# Patient Record
Sex: MALE | Race: WHITE | Employment: FULL TIME | URBAN - METROPOLITAN AREA
[De-identification: names, ages, dates, MRNs, and addresses within clinical notes are randomized per-mention and may not be internally consistent; named-entity substitution may affect disease eponyms.]

---

## 2020-11-05 ENCOUNTER — VIRTUAL VISIT (OUTPATIENT)
Dept: FAMILY MEDICINE CLINIC | Age: 25
End: 2020-11-05
Payer: COMMERCIAL

## 2020-11-05 PROCEDURE — 99202 OFFICE O/P NEW SF 15 MIN: CPT | Performed by: FAMILY MEDICINE

## 2020-11-05 RX ORDER — NAPROXEN 500 MG/1
500 TABLET ORAL 2 TIMES DAILY
COMMUNITY
Start: 2019-11-23 | End: 2021-09-20

## 2020-11-05 ASSESSMENT — PATIENT HEALTH QUESTIONNAIRE - PHQ9
2. FEELING DOWN, DEPRESSED OR HOPELESS: 0
SUM OF ALL RESPONSES TO PHQ QUESTIONS 1-9: 0
1. LITTLE INTEREST OR PLEASURE IN DOING THINGS: 0
SUM OF ALL RESPONSES TO PHQ9 QUESTIONS 1 & 2: 0
SUM OF ALL RESPONSES TO PHQ QUESTIONS 1-9: 0
SUM OF ALL RESPONSES TO PHQ QUESTIONS 1-9: 0

## 2020-11-05 ASSESSMENT — ENCOUNTER SYMPTOMS
ABDOMINAL PAIN: 0
CONSTIPATION: 0
DIARRHEA: 0
RHINORRHEA: 0
SORE THROAT: 0
SHORTNESS OF BREATH: 0
CHEST TIGHTNESS: 0

## 2020-11-05 NOTE — PROGRESS NOTES
Subjective:   Patient ID: Babak Little is a 30 y.o. male. HPI by clinical support staff:   Chief Complaint   Patient presents with    Wrist Injury     Patient complains of right wrist injury on 11/3. Denies any swelling. Preliminary data above this line collected by clinical support staff.    ______________________________________________________________________     HPI by Provider:   HPI   Patient presents via virtual visit to establish care. History reviewed and updated with patient today. Reports right wrist pain 2 days ago while at the gym, did not do anything out of his routine but noticed pain was worse after an hour of exercising. Denies any previous fracture in site. Pain 8/10 when he rotates wrist, mild with palpation. Tried hand brace and ibuprofen 800 mg every 4 hours with some relief. Worried because he [place] this [place] and doesn't want to worsen injury. Wondering if he can get an MRI. Data above this line collected by Provider. Patient's medications, allergies, past medical, surgical, social and family histories were reviewed and updated as appropriate. Patient Care Team:  Coleman Shepard MD as PCP - General (Family Medicine)  Coleman Shepard MD as PCP - [facility] Empaneled Provider  Current Outpatient Medications on File Prior to Visit   Medication Sig Dispense Refill    naproxen (NAPROSYN) 500 MG tablet Take 500 mg by mouth 2 times daily      cetirizine (ZYRTEC) 10 MG tablet Take 10 mg by mouth daily       No current facility-administered medications on file prior to visit. Review of Systems   Constitutional: Negative for activity change, appetite change, fatigue and fever. HENT: Negative for congestion, rhinorrhea and sore throat. Respiratory: Negative for chest tightness and shortness of breath. Cardiovascular: Negative for chest pain, palpitations and leg swelling. Gastrointestinal: Negative for abdominal pain, constipation and diarrhea. Genitourinary: Negative for dysuria and frequency. Musculoskeletal: Negative for arthralgias. Right wrist pain   Neurological: Negative for dizziness, weakness and headaches. Psychiatric/Behavioral: Negative for hallucinations. All other systems reviewed and are negative. ROS above this line reviewed by Provider. Objective: There were no vitals taken for this visit. Physical Exam  Nursing note reviewed. Constitutional:       General: He is not in acute distress. Appearance: Normal appearance. He is not ill-appearing, toxic-appearing or diaphoretic. HENT:      Head: Normocephalic and atraumatic. Pulmonary:      Effort: Pulmonary effort is normal.   Neurological:      Mental Status: He is alert. Psychiatric:         Mood and Affect: Mood normal.         Behavior: Behavior normal.       Assessment and Plan:   1. Acute pain of right wrist  Likely a strain, advised brace, antiinflammatory and ice. Follow up with not improving in 1 week. Osvaldo Estrella  is a 30 y.o. male being evaluated by a Virtual Visit (video visit) encounter to address concerns as mentioned above. A caregiver was present when appropriate. Due to this being a TeleHealth encounter (During [facility]- public health emergency), evaluation of the following organ systems was limited: Vitals/Constitutional/EENT/Resp/CV/GI//MS/Neuro/Skin/Heme-Lymph-Imm. Pursuant to the emergency declaration under the [facility], [address] [place] [address] authority and the [employer] and [employer] Act, this Virtual Visit was conducted with patient's (and/or legal guardian's) consent, to reduce the patient's risk of exposure to COVID-19 and provide necessary medical care. The patient (and/or legal guardian) has also been advised to contact this office for worsening conditions or problems, and seek emergency medical treatment and/or call 911 if deemed necessary.      Patient identification was verified at the start of the visit: Yes    Total time spent for this encounter: Not billed by time    Services were provided through a video synchronous discussion virtually to substitute for in-person clinic visit. Patient  located at their individual homes. This chart note was prepared using a voice recognition dictation program. This note was reviewed for accuracy; however, addition, deletion and sound-alike word errors may occur. If there are any questions regarding this chart note, please contact the originating provider. Electronically signed by   Jsoefina Crandall MD  11/5/2020   9:43 AM    No follow-ups on file.

## 2020-12-02 ENCOUNTER — OFFICE VISIT (OUTPATIENT)
Dept: FAMILY MEDICINE CLINIC | Age: 25
End: 2020-12-02
Payer: COMMERCIAL

## 2020-12-02 ENCOUNTER — HOSPITAL ENCOUNTER (OUTPATIENT)
Age: 25
Discharge: HOME | End: 2020-12-02
Payer: COMMERCIAL

## 2020-12-02 ENCOUNTER — HOSPITAL ENCOUNTER (OUTPATIENT)
Dept: GENERAL RADIOLOGY | Age: 25
Discharge: HOME | End: 2020-12-02
Payer: COMMERCIAL

## 2020-12-02 ENCOUNTER — TELEPHONE (OUTPATIENT)
Dept: FAMILY MEDICINE CLINIC | Age: 25
End: 2020-12-02

## 2020-12-02 VITALS
SYSTOLIC BLOOD PRESSURE: 124 MMHG | HEIGHT: 71 IN | BODY MASS INDEX: 26.46 KG/M2 | HEART RATE: 66 BPM | TEMPERATURE: 98 F | WEIGHT: 189 LBS | DIASTOLIC BLOOD PRESSURE: 72 MMHG | OXYGEN SATURATION: 98 %

## 2020-12-02 PROCEDURE — 99213 OFFICE O/P EST LOW 20 MIN: CPT | Performed by: FAMILY MEDICINE

## 2020-12-02 PROCEDURE — 73110 X-RAY EXAM OF WRIST: CPT

## 2020-12-02 ASSESSMENT — ENCOUNTER SYMPTOMS
CHEST TIGHTNESS: 0
RHINORRHEA: 0
ABDOMINAL PAIN: 0
CONSTIPATION: 0
DIARRHEA: 0
SORE THROAT: 0
SHORTNESS OF BREATH: 0

## 2020-12-02 NOTE — PATIENT INSTRUCTIONS
Hi Mr. Oumou Lopez,  It was a pleasure seeing you today. As discussed:  ·  I have placed a referral for physical therapy, please call them if you do not hear from them in 7 days. · Get your imaging done soon - you can call [phone] to schedule your imaging. Please do not hesitate to call or send a message if you have questions or concerns. Our goal is to ensure your complete wellbeing. Enjoy the rest of the week.   Dr Ritu Swenson

## 2020-12-02 NOTE — PROGRESS NOTES
Subjective:   Patient ID: Komal Simms is a 30 y.o. male. HPI by clinical support staff:   **   Preliminary data above this line collected by clinical support staff.    ______________________________________________________________________     HPI by Provider:   HPI   Patient presents for follow up on wrist pain. Only painful  When lifting objects- no tenderness or  Limited motion. Continued use of brace  And takes Vit D and calcium. Pain severe when present but mostly has no pain. Data above this line collected by Provider. Patient's medications, allergies, past medical, surgical, social and family histories were reviewed and updated as appropriate. Patient Care Team:  Valeria Polanco MD as PCP - General (Family Medicine)  Valeria Polanco MD as PCP - [facility] Empaneled Provider    Current Outpatient Medications on File Prior to Visit   Medication Sig Dispense Refill    naproxen (NAPROSYN) 500 MG tablet Take 500 mg by mouth 2 times daily      cetirizine (ZYRTEC) 10 MG tablet Take 10 mg by mouth daily       No current facility-administered medications on file prior to visit. Review of Systems   Constitutional: Negative for activity change, appetite change, fatigue and fever. HENT: Negative for congestion, rhinorrhea and sore throat. Respiratory: Negative for chest tightness and shortness of breath. Cardiovascular: Negative for chest pain, palpitations and leg swelling. Gastrointestinal: Negative for abdominal pain, constipation and diarrhea. Genitourinary: Negative for dysuria and frequency. Musculoskeletal: Positive for arthralgias. Negative for gait problem, joint swelling and myalgias. Neurological: Negative for dizziness, weakness and headaches. Psychiatric/Behavioral: Negative for hallucinations. All other systems reviewed and are negative. ROS above this line reviewed by Provider.       Objective:   /72 (Site: Right Upper Arm, Position: Sitting, Cuff Size: Small

## 2020-12-06 ENCOUNTER — HOSPITAL ENCOUNTER (OUTPATIENT)
Dept: MRI IMAGING | Age: 25
Discharge: HOME | End: 2020-12-06
Payer: COMMERCIAL

## 2020-12-06 PROCEDURE — 73221 MRI JOINT UPR EXTREM W/O DYE: CPT

## 2020-12-08 ENCOUNTER — PATIENT MESSAGE (OUTPATIENT)
Dept: FAMILY MEDICINE CLINIC | Age: 25
End: 2020-12-08

## 2020-12-09 NOTE — TELEPHONE ENCOUNTER
Spoke to patient- explained mri results, clarified role of orthopedics. Contact information provided.

## 2020-12-09 NOTE — TELEPHONE ENCOUNTER
From: Madeline Serrano  To: Esha Leon MD  Sent: 12/8/2020 5:02 PM EST  Subject: Test Results Question    Dr. Leonora Stallings,    Would you be able to elaborate on the comments you made on my MRI results? You mention an \"old fracture\" which I do not remember discussing, unless you are referring to the current injury? Also is the referral to orthopedics for finalizing a diagnosis or for determining solutions, or both? If you would be able to briefly discuss over the phone I would appreciate it, my number is [phone]. Thank you!   Ban Lee

## 2020-12-15 ENCOUNTER — OFFICE VISIT (OUTPATIENT)
Dept: ORTHOPEDIC SURGERY | Age: 25
End: 2020-12-15
Payer: COMMERCIAL

## 2020-12-15 VITALS — TEMPERATURE: 96.7 F | WEIGHT: 185 LBS | HEIGHT: 71 IN | BODY MASS INDEX: 25.9 KG/M2

## 2020-12-15 PROCEDURE — 99243 OFF/OP CNSLTJ NEW/EST LOW 30: CPT | Performed by: ORTHOPAEDIC SURGERY

## 2020-12-15 NOTE — PROGRESS NOTES
This 30 y.o. right hand dominant  is seen in consultation for Priyank Cordova MD with a chief complaint of pain in the right wrist.  Symptoms have been present for 4 weeks. Pain was first noticed while lifting weights in the gym, which he does frequently, but there is no history of injury. Pain is most severe on the dorsal aspect but becomes diffuse toward the ulnar side. It is minimal at rest and aggravated by twisting movements, lifting and gripping. It not associated with paresthesia, crepitance or mechanical symptoms. Swelling has not been noticed. Similar pain is not noted in the opposite upper extremity. Symptoms have improved recently. Xrays were done and read as showing a fracture, prompting this referral.  An MRI was also done. The pain assessment has been reviewed and is correct. The patient's social history, past medical history, family history, medications, allergies and review of systems, entered 12/15/20, have been reviewed, and dated and are recorded in the chart. On physical examination the patient is Height: 5' 11\" (180.3 cm) tall and weighs Weight: 185 lb (83.9 kg). Respirations are 18 per minute. The patient is well nourished, is oriented to time and place, demonstrates appropriate mood and affect as well as normal gait and station. There is no soft tissue swelling present about the right wrist.  There is no discoloration. There is no deformity or atrophy. Tenderness is present on palpation anywhere about the wrist including the DRUJ, TFCC and scaphoid. Range of motion of the fingers, thumb and wrist is normal bilaterally and is not accompanied by pain or crepetance. Skin is intact, as is distal circulation and sensation. Gross muscle strength is normal bilaterally. Hand and wrist joints are stable. There are no subcutaneous nodules or enlarged epitrochlear lymph nodes. Xrays: Review of outside Xrays of the left wrist demonstrate a triangular shaped ossicle located between the lunate, triquetrum, capitate and hamate, surrounded on all sides with smooth joint cartilage spaces. A MRI of the right wrist confirms the Xray images. 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 The Xrays are shown to the patient. 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 5/1/53). No medical or surgical treatment is indicated at this time. Activity modifications are discussed. I would anticipate continued slow resolution of his remaining symptoms over the next several weeks. The usual course of events in the resolution of the symptoms associated with this condition is fully discussed with the patient. As long as they progress as expected, they do not need to return for further follow up. They are, however, urged to call or return if they have questions or concerns.

## 2021-02-23 ENCOUNTER — PATIENT MESSAGE (OUTPATIENT)
Dept: FAMILY MEDICINE CLINIC | Age: 26
End: 2021-02-23

## 2021-02-23 ENCOUNTER — OFFICE VISIT (OUTPATIENT)
Dept: FAMILY MEDICINE CLINIC | Age: 26
End: 2021-02-23
Payer: COMMERCIAL

## 2021-02-23 VITALS
HEART RATE: 65 BPM | BODY MASS INDEX: 26.28 KG/M2 | WEIGHT: 194 LBS | TEMPERATURE: 98.1 F | HEIGHT: 72 IN | SYSTOLIC BLOOD PRESSURE: 106 MMHG | DIASTOLIC BLOOD PRESSURE: 60 MMHG | OXYGEN SATURATION: 98 %

## 2021-02-23 DIAGNOSIS — Z00.00 PREVENTATIVE HEALTH CARE: Primary | ICD-10-CM

## 2021-02-23 DIAGNOSIS — Z00.00 PREVENTATIVE HEALTH CARE: ICD-10-CM

## 2021-02-23 LAB
BASOPHILS ABSOLUTE: 0.1 K/UL (ref 0–0.2)
BASOPHILS RELATIVE PERCENT: 1.2 %
EOSINOPHILS ABSOLUTE: 0.2 K/UL (ref 0–0.6)
EOSINOPHILS RELATIVE PERCENT: 3.3 %
HCT VFR BLD CALC: 43.8 % (ref 40.5–52.5)
HEMOGLOBIN: 15 G/DL (ref 13.5–17.5)
LYMPHOCYTES ABSOLUTE: 1.6 K/UL (ref 1–5.1)
LYMPHOCYTES RELATIVE PERCENT: 29.2 %
MCH RBC QN AUTO: 30.5 PG (ref 26–34)
MCHC RBC AUTO-ENTMCNC: 34.2 G/DL (ref 31–36)
MCV RBC AUTO: 89.3 FL (ref 80–100)
MONOCYTES ABSOLUTE: 0.5 K/UL (ref 0–1.3)
MONOCYTES RELATIVE PERCENT: 10 %
NEUTROPHILS ABSOLUTE: 3.1 K/UL (ref 1.7–7.7)
NEUTROPHILS RELATIVE PERCENT: 56.3 %
PDW BLD-RTO: 13.2 % (ref 12.4–15.4)
PLATELET # BLD: 203 K/UL (ref 135–450)
PMV BLD AUTO: 9.9 FL (ref 5–10.5)
RBC # BLD: 4.9 M/UL (ref 4.2–5.9)
WBC # BLD: 5.4 K/UL (ref 4–11)

## 2021-02-23 PROCEDURE — 99395 PREV VISIT EST AGE 18-39: CPT | Performed by: FAMILY MEDICINE

## 2021-02-23 SDOH — ECONOMIC STABILITY: INCOME INSECURITY: HOW HARD IS IT FOR YOU TO PAY FOR THE VERY BASICS LIKE FOOD, HOUSING, MEDICAL CARE, AND HEATING?: NOT HARD AT ALL

## 2021-02-23 ASSESSMENT — PATIENT HEALTH QUESTIONNAIRE - PHQ9
1. LITTLE INTEREST OR PLEASURE IN DOING THINGS: 0
SUM OF ALL RESPONSES TO PHQ QUESTIONS 1-9: 0
SUM OF ALL RESPONSES TO PHQ9 QUESTIONS 1 & 2: 0

## 2021-02-23 ASSESSMENT — ENCOUNTER SYMPTOMS
CONSTIPATION: 0
CHEST TIGHTNESS: 0
DIARRHEA: 0
ABDOMINAL PAIN: 0
RHINORRHEA: 0
SORE THROAT: 0
SHORTNESS OF BREATH: 0

## 2021-02-23 NOTE — TELEPHONE ENCOUNTER
From: Dileep Mariano  To: Silvestre Cortez MD  Sent: 2/23/2021 2:02 PM EST  Subject: Visit Follow-Up Question    As discussed in my appointment this morning, attached is the list of immunizations with dates that I received prior to moving to [place]. It appears that this confirms I am due for another Tetanus shot? Hopefully this information can be logged with [facility] to create a complete vaccination history.     Thank you,  Rosamaria Cox

## 2021-02-23 NOTE — PATIENT INSTRUCTIONS
Hi Mr. Ella Causey,  It was a pleasure seeing you today. As discussed:  · Please get your labs done at your earliest convenience-fasting. We will call you with the results. Please do not hesitate to call or send a message if you have questions or concerns. Our goal is to ensure your complete wellbeing. Enjoy the rest of the week.   Dr Jeanie Souza

## 2021-02-23 NOTE — PROGRESS NOTES
Annual Exam  SUBJECTIVE  Leah Sanchez is a 27 y.o. male and is here for a comprehensive physical exam- last exam was. The patient is sexually active- female. Performs self testicular exams yes . The patient participates in regular exercise: yes 5 times a week for 1-2 hours- lifting and elliptical. Has the patient ever been transfused or tattooed?: yes. Reports seatbelt use. Diet: described as healthy. Goals for 2021: \"stay healthy\"  Last eye exam: 3 years ago  Last Dental visit: 2 weeks ago. Additional History, if indicated: n/a    PHQ-2/9 Depression Screening from Assessments   PHQ Scores 2/23/2021 11/5/2020   PHQ2 Score 0 0   PHQ9 Score 0 0         Health Maintenance Due   Topic Date Due    DTaP/Tdap/Td vaccine (1 - Tdap) 02/05/2012     Current Outpatient Medications on File Prior to Visit   Medication Sig Dispense Refill    naproxen (NAPROSYN) 500 MG tablet Take 500 mg by mouth 2 times daily      cetirizine (ZYRTEC) 10 MG tablet Take 10 mg by mouth daily       No current facility-administered medications on file prior to visit. Past Medical History:   Diagnosis Date    ADHD 2002     No past surgical history on file.     No Known Allergies  Social History     Socioeconomic History    Marital status: Single     Spouse name: Not on file    Number of children: Not on file    Years of education: Not on file    Highest education level: Not on file   Occupational History    Occupation:     Social Needs    Financial resource strain: Not hard at all   [employer] insecurity     Worry: Never true     Inability: Never true   [employer] needs     Medical: No     Non-medical: No   Tobacco Use    Smoking status: Never Smoker    Smokeless tobacco: Never Used   Substance and Sexual Activity    Alcohol use: Yes     Comment: couple times a week    Drug use: No    Sexual activity: Yes     Partners: Female   Lifestyle  Physical activity     Days per week: Not on file     Minutes per session: Not on file    Stress: Not on file   Relationships    Social connections     Talks on phone: Not on file     Gets together: Not on file     Attends [employer] service: Not on file     Active member of club or organization: Not on file     Attends meetings of clubs or organizations: Not on file     Relationship status: Not on file    Intimate partner violence     Fear of current or ex partner: Not on file     Emotionally abused: Not on file     Physically abused: Not on file     Forced sexual activity: Not on file   Other Topics Concern    Not on file   Social History Narrative    Not on file     Review of Systems   Constitutional: Negative for activity change, appetite change, fatigue and fever. HENT: Negative for congestion, rhinorrhea and sore throat. Respiratory: Negative for chest tightness and shortness of breath. Cardiovascular: Negative for chest pain, palpitations and leg swelling. Gastrointestinal: Negative for abdominal pain, constipation and diarrhea. Genitourinary: Negative for dysuria and frequency. Musculoskeletal: Negative for arthralgias. Neurological: Negative for dizziness, weakness and headaches. Psychiatric/Behavioral: Negative for hallucinations. All other systems reviewed and are negative. OBJECTIVE  /60   Pulse 65   Temp 98.1 °F (36.7 °C)   Ht 5' 11.5\" (1.816 m)   Wt 194 lb (88 kg)   SpO2 98%   BMI 26.68 kg/m²   Chaperone not applicable for exam.   Physical Exam  Vitals signs and nursing note reviewed. Constitutional:       General: He is not in acute distress. Appearance: Normal appearance. He is normal weight. He is not ill-appearing, toxic-appearing or diaphoretic. HENT:      Head: Normocephalic and atraumatic. Right Ear: Tympanic membrane, ear canal and external ear normal. There is no impacted cerumen. Left Ear: Tympanic membrane, ear canal and external ear normal. There is no impacted cerumen. Nose: Nose normal. No congestion. Mouth/Throat:      Mouth: Mucous membranes are moist.      Pharynx: No oropharyngeal exudate or posterior oropharyngeal erythema. Eyes:      General: No scleral icterus. Conjunctiva/sclera: Conjunctivae normal.   Neck:      Musculoskeletal: Normal range of motion and neck supple. Cardiovascular:      Rate and Rhythm: Normal rate and regular rhythm. Heart sounds: Normal heart sounds. No murmur. No friction rub. No gallop. Pulmonary:      Effort: Pulmonary effort is normal. No respiratory distress. Breath sounds: Normal breath sounds. No stridor. No wheezing, rhonchi or rales. Abdominal:      General: Abdomen is flat. Bowel sounds are normal. There is no distension. Palpations: Abdomen is soft. Tenderness: There is no abdominal tenderness. Skin:     General: Skin is warm and dry. Neurological:      General: No focal deficit present. Mental Status: He is alert. Psychiatric:         Mood and Affect: Mood normal.         Behavior: Behavior normal.        Labs  Lab Results   Component Value Date    WBC 9.4 05/15/2017    HGB 15.9 05/15/2017    HCT 47.0 05/15/2017     05/15/2017    ALT 20 05/15/2017    AST 24 05/15/2017     05/15/2017    K 4.0 05/15/2017    CREATININE 1.0 05/15/2017    BUN 12 05/15/2017       ASSESSMENT AND PLAN  1. Preventative Exam (Preventive Services Calculator (USPSTF/AHRQ):  During this preventive care visit, the following were discussed and/or reviewed for appropriateness for this patient:  · Healthy diet and exercise habits. · Importance of self skin exam and sun protection  · Controversies around prostate cancer screening. Reviewed current differences in recommendations and risks/benefits of screening. · Reviewed need for lipid and glucose screening. · Reviewed immunization history. · Need for HIV or additional STD screening. · Need for tobacco/substance abuse cessation. · Appropriate quantity of alcohol intake. Further studies/referrals as noted. Recommend annual visit and as needed. - CBC Auto Differential; Future  - Comprehensive Metabolic Panel; Future  - Hemoglobin A1C; Future  - TSH WITH REFLEX TO FT4; Future  - Lipid Panel; Future         This chart note was prepared using a voice recognition dictation program.This note was reviewed for accuracy; however, addition,deletion and sound-alike word errors may occur. If there is any question regarding this chart note, please contact the originating provider.     Electronically signed by  May Jose MD  2/23/2021   11:57 AM    Return in about 1 year (around 2/23/2022) for Annual exam.

## 2021-02-24 LAB
A/G RATIO: 1.7 (ref 1.1–2.2)
ALBUMIN SERPL-MCNC: 4.5 G/DL (ref 3.4–5)
ALP BLD-CCNC: 56 U/L (ref 40–129)
ALT SERPL-CCNC: 34 U/L (ref 10–40)
ANION GAP SERPL CALCULATED.3IONS-SCNC: 11 MMOL/L (ref 3–16)
AST SERPL-CCNC: 32 U/L (ref 15–37)
BILIRUB SERPL-MCNC: 0.5 MG/DL (ref 0–1)
BUN BLDV-MCNC: 14 MG/DL (ref 7–20)
CALCIUM SERPL-MCNC: 9.4 MG/DL (ref 8.3–10.6)
CHLORIDE BLD-SCNC: 99 MMOL/L (ref 99–110)
CHOLESTEROL, TOTAL: 165 MG/DL (ref 0–199)
CO2: 26 MMOL/L (ref 21–32)
CREAT SERPL-MCNC: 0.9 MG/DL (ref 0.9–1.3)
ESTIMATED AVERAGE GLUCOSE: 91.1 MG/DL
GFR AFRICAN AMERICAN: >60
GFR NON-AFRICAN AMERICAN: >60
GLOBULIN: 2.6 G/DL
GLUCOSE BLD-MCNC: 95 MG/DL (ref 70–99)
HBA1C MFR BLD: 4.8 %
HDLC SERPL-MCNC: 66 MG/DL (ref 40–60)
LDL CHOLESTEROL CALCULATED: 88 MG/DL
POTASSIUM SERPL-SCNC: 4.4 MMOL/L (ref 3.5–5.1)
SODIUM BLD-SCNC: 136 MMOL/L (ref 136–145)
TOTAL PROTEIN: 7.1 G/DL (ref 6.4–8.2)
TRIGL SERPL-MCNC: 56 MG/DL (ref 0–150)
TSH REFLEX FT4: 1.45 UIU/ML (ref 0.27–4.2)
VLDLC SERPL CALC-MCNC: 11 MG/DL

## 2021-02-27 ENCOUNTER — PATIENT MESSAGE (OUTPATIENT)
Dept: FAMILY MEDICINE CLINIC | Age: 26
End: 2021-02-27

## 2021-02-27 DIAGNOSIS — M25.531 RIGHT WRIST PAIN: Primary | ICD-10-CM

## 2021-03-01 NOTE — TELEPHONE ENCOUNTER
From: Dionisio George  To: Julius Polk MD  Sent: 2/27/2021 3:02 PM EST  Subject: Non-Urgent Medical Question    My right wrist has flared up and is as bad as it was at the instance of the original injury back in November 2020. I would like to get another MRI and have it looked at by a different orthopedist. Could you please order an MRI when possible? My previous wrist MRI did not take place until weeks after the injury, so I hope that a second that is received while it is more enflamed might yield a better diagnosis that I can do something with? Unlike the diagnosis I got from Dr. Dejuan Douglass that I had no injury at all (which recent events seem to indicate was not correct). If you have any questions or there is something needed from me in order to move forward, or you would advise a different course of action, please let me know. Thank you!

## 2021-03-08 ENCOUNTER — OFFICE VISIT (OUTPATIENT)
Dept: ORTHOPEDIC SURGERY | Age: 26
End: 2021-03-08
Payer: COMMERCIAL

## 2021-03-08 VITALS — HEIGHT: 72 IN | WEIGHT: 194 LBS | RESPIRATION RATE: 16 BRPM | BODY MASS INDEX: 26.28 KG/M2 | TEMPERATURE: 97.5 F

## 2021-03-08 DIAGNOSIS — M25.531 WRIST PAIN, RIGHT: Primary | ICD-10-CM

## 2021-03-08 PROCEDURE — 99213 OFFICE O/P EST LOW 20 MIN: CPT | Performed by: ORTHOPAEDIC SURGERY

## 2021-03-08 NOTE — Clinical Note
Dear  Joycie Duverney, MD,    Thank you very much for your referral or Mr. Emily Gregory to me for evaluation and treatment of his Hand & Wrist condition. I appreciate your confidence in me and thank you for allowing me the opportunity to care for your patients. If I can be of any further assistance to you on this or any other patient, please do not hesitate to contact me. Sincerely,    Sumeet Oh.  Geo Brooks MD

## 2021-03-09 NOTE — PROGRESS NOTES
Mr. Dileep Mariano is a 27 y.o. right handed man  who is seen today in Hand Surgical Consultation at the request of Silvestre Cortez MD.    He presents today regarding right Wrist symptoms which have been present for approximately 6 months. A history of antecedent trauma or injury is Absent. He reports symptoms to include  moderate pain located in the Ulnar aspect of the wrist, no tenderness of the remaining fingers, hand, or elbow. He notes today, no neurologic symptoms in the Whole Hand. Symptoms were improving over time with rest, but have recently worsened over the last week with increased weightlifting. .      Previous treatment has included evaluation by  Dr. Rom Flores in November 2020 where an Os Epitriquetrum was identified on X-ray and conservative management of his symptoms was recommended. He does not claim relation of his symptoms to his required work activities. He has undergone any testing. I have today reviewed with Dileep Mariano the clinically relevant, past medical history, medications, allergies,  family history, social history, and Review Of Systems & I have documented any details relevant to today's presenting complaints in my history above. Mr. Windy Valenzuela self-reported past medical history, medications, allergies,  family history, social history, and Review Of Systems have been scanned into the chart under the \"Media\" tab. Physical Exam:  Mr. Windy Valenzuela most recent vitals:  Vitals  Temp: 97.5 °F (36.4 °C)  Temp Source: Infrared  Resp: 16  Height: 5' 11.5\" (181.6 cm)  Weight: 194 lb (88 kg)    He is well nourished, oriented to person, place & time. He demonstrates appropriate mood and affect as well as normal gait and station.     Skin: Normal in appearance, Normal Color and Free of Lesions Bilaterally   Digital range of motion is Full and equal bilaterally otherwise normal bilaterally  Wrist range of motion is full but mildly painful in maximal extension and ulnar deviation  on the Right, normal on the Left  Sensation is subjectively normal in the Whole Hand. All other digits are normally sensate bilaterally  Vascular examination reveals normal and good capillary refill bilaterally. There is no acute ecchymosis. Swelling is minimal in the Ulnar aspect of the wrist.  No other digit or hand bilaterally shows sign of swelling. There is no evidence of gross joint instability bilaterally. Muscular strength is clinically appropriate bilaterally. The base of the hand & wrist are not tender to palpation. There is not clinical evidence of skeletal deformity or mal-rotation. Maximal pain is elicited with palpation of the Ulnar aspect of the wrist. Specifically, the Ulnar Styloid is moderately tender to palpation, the dorsal margin of the TFCC is not tender to palpation, the DRUJ is mildly tender to palpation and without effusion or instability, the Ulnar Intra-Carpal joints are not tender to palpation and without instability, the ECU tendon is not tender to palpation and is not subluxing from it's sub-sheath. Ulnar sided pain is  exacerbated with maximal wrist Ulnar Deviation and is worse in full supination, unchanged in neutral rotation, and is unchanged in full pronation. Radiographic Evaluation:  Radiographs are reviewed  today (3 views of the right wrist were obtained in NEUTRAL ROTATION). They demonstrate no evidence of an acute fracture of the distal radius, ulna, or carpal bones (specifically the scaphoid appears normal). The CMC joints & bases of the  Metacarpals do not show displacement, acute injury, or acute change. The Distal Radio-Ulnar Joint reveals mild evidence of degenerative change. The Ulna shows 3 mm negative variance, worse on Maximal  View. The Ulnar Styloid is unusually large and prominent and on  view approaches the triquetrum closely. There is no evidence of cystic change of the proximal ulnar base of the Lunate.   There is not evidence of other static carpal instability of mal-position. MRI RIGHT WRIST:  12/6/20  Impression       1. Small corner fracture or remote bony defect along the upper margin lunate near the triquetrum without active edema or acute fracture in the wrist.       2. No joint effusion or capsular injury identified. The TFCC remains intact. Flexor and extensor tendons appear normal. No carpal ganglion on or midcarpal instability appreciated. Impression:  Mr. Emily Gregory presents with right Ulnar Sided Wrist pain and presents requesting further treatment. Plan:  I have had a thorough discussion with Mr. Emily Gregory regarding the treatment options available for his continued and recently worsened right  Ulnar Sided wrist pain, which is causing him significant symptoms and difficulty. I have outlined for Mr. Emily Gregory the risk, benefits and consequences of the various treatment modalities, including a reasonable expectation for the long term success of each. We have discussed the likelihood that further, more aggressive treatment may be required for his current presenting condition. Based upon our current discussion and a reasonable understating of the options available to him, Mr. Emily Gregory has selected to proceed with a conservative plan of treatment consisting of: the use of protective splints, activity modification, and the judicious use of over-the-counter anti-inflammatory medications if allowed by his primary care physician. An appropriately sized Removable forearm based Wrist orthosis was provided. Instructions were given regarding splint use and wear as well as suggestions for use of the other modalities were discussed. I have clearly explained to him that the above outlined treatment plan should not be expected to 'cure' his  Ulnar sided wrist pain, but we are rather treating the symptoms with which he presents.   He has understood that in order to achieve more durable relief of his symptoms and to prevent future worsening or further damage, that definitive surgical treatment would be required. Mr. Иван Yang  voiced an appropriate understanding of our discussion, the options available to him, and of the expectations of his selected  treatment. I have also discussed with Mr. Иван Yang  the other treatment options available to him  for this condition. We have today selected to proceed with conservative management. He and I have agreed that if our current course of conservative treatment does not prove to be effective over the short term future, that he will schedule a follow-up appointment to discuss and select an alternate course of therapy including possibly injection or surgical treatment. Mr. Иван Yang has been given a full verbal list of instructions and precautions related to his present condition. I have asked him to followup with me in the office at the prescribed time. He is also specifically requested to call or return to the office sooner if his symptoms change or worsen prior to the next scheduled appointment.

## 2021-04-01 ENCOUNTER — IMMUNIZATION (OUTPATIENT)
Dept: PRIMARY CARE CLINIC | Age: 26
End: 2021-04-01
Payer: COMMERCIAL

## 2021-04-01 PROCEDURE — 0011A COVID-19, MODERNA VACCINE 100MCG/0.5ML DOSE: CPT | Performed by: FAMILY MEDICINE

## 2021-04-01 PROCEDURE — 91301 COVID-19, MODERNA VACCINE 100MCG/0.5ML DOSE: CPT | Performed by: FAMILY MEDICINE

## 2021-04-29 ENCOUNTER — IMMUNIZATION (OUTPATIENT)
Dept: PRIMARY CARE CLINIC | Age: 26
End: 2021-04-29
Payer: COMMERCIAL

## 2021-04-29 PROCEDURE — 91301 COVID-19, MODERNA VACCINE 100MCG/0.5ML DOSE: CPT | Performed by: FAMILY MEDICINE

## 2021-04-29 PROCEDURE — 0012A COVID-19, MODERNA VACCINE 100MCG/0.5ML DOSE: CPT | Performed by: FAMILY MEDICINE

## 2021-08-20 ENCOUNTER — TELEPHONE (OUTPATIENT)
Dept: FAMILY MEDICINE CLINIC | Age: 26
End: 2021-08-20

## 2021-08-20 NOTE — TELEPHONE ENCOUNTER
Pt's mother called and spoke to registrar  She was requesting information about rehab facilities, Nephrologist referral, dietician,  [place], mother is not on [address]   Pt's mother was advised provider out of office today and mother said to cancel everything,   Unable to each mother again when I tried to contact her

## 2021-09-20 ENCOUNTER — PATIENT MESSAGE (OUTPATIENT)
Dept: FAMILY MEDICINE CLINIC | Age: 26
End: 2021-09-20

## 2021-09-20 DIAGNOSIS — M25.512 ACUTE PAIN OF BOTH SHOULDERS: Primary | ICD-10-CM

## 2021-09-20 DIAGNOSIS — M25.511 ACUTE PAIN OF BOTH SHOULDERS: Primary | ICD-10-CM

## 2021-09-20 DIAGNOSIS — M62.82 NON-TRAUMATIC RHABDOMYOLYSIS: Primary | ICD-10-CM

## 2021-09-20 NOTE — TELEPHONE ENCOUNTER
From: Babak Little  To: Coleman Shepard MD  Sent: 9/20/2021 12:42 PM EDT  Subject: Non-Urgent Medical Question    Dr. Brenda Prescott,    I am currently in 2 arm slings recovering from double shoulder surgery. Surgery was required to repair bilateral dislocation/fracture sustained while being treated for rhabdomyolysis and hyponatremia which were contracted simultaneously. As early as October 16 I will need to see a nephrologist and neurologist, and begin physical therapy to regain shoulder function. I do not need any referrals. Might you be able to recommend a nephrologist or neurologist, or where to go for PT? I would greatly appreciate any assistance you could provide. Thank you!   Keny Gutiérrez

## 2021-11-22 ENCOUNTER — TELEPHONE (OUTPATIENT)
Dept: FAMILY MEDICINE CLINIC | Age: 26
End: 2021-11-22

## 2021-11-24 ENCOUNTER — HOSPITAL ENCOUNTER (OUTPATIENT)
Dept: GENERAL RADIOLOGY | Age: 26
Discharge: HOME | End: 2021-11-24
Payer: COMMERCIAL

## 2021-11-24 ENCOUNTER — OFFICE VISIT (OUTPATIENT)
Dept: FAMILY MEDICINE CLINIC | Age: 26
End: 2021-11-24
Payer: COMMERCIAL

## 2021-11-24 VITALS
RESPIRATION RATE: 16 BRPM | OXYGEN SATURATION: 97 % | WEIGHT: 175.2 LBS | HEART RATE: 61 BPM | HEIGHT: 69 IN | BODY MASS INDEX: 25.95 KG/M2 | TEMPERATURE: 97.2 F | SYSTOLIC BLOOD PRESSURE: 110 MMHG | DIASTOLIC BLOOD PRESSURE: 70 MMHG

## 2021-11-24 DIAGNOSIS — R07.81 RIB PAIN: ICD-10-CM

## 2021-11-24 DIAGNOSIS — D64.9 ANEMIA, UNSPECIFIED TYPE: ICD-10-CM

## 2021-11-24 DIAGNOSIS — Z00.00 PREVENTATIVE HEALTH CARE: Primary | ICD-10-CM

## 2021-11-24 DIAGNOSIS — Z00.00 PREVENTATIVE HEALTH CARE: ICD-10-CM

## 2021-11-24 PROBLEM — M24.312 PATHOLOGICAL DISLOCATION OF SHOULDER JOINT, BILATERAL: Status: RESOLVED | Noted: 2021-09-01 | Resolved: 2021-11-24

## 2021-11-24 PROBLEM — M24.312 PATHOLOGICAL DISLOCATION OF SHOULDER JOINT, BILATERAL: Status: ACTIVE | Noted: 2021-09-01

## 2021-11-24 PROBLEM — M62.82 RHABDOMYOLYSIS: Status: ACTIVE | Noted: 2021-08-25

## 2021-11-24 PROBLEM — M24.311 PATHOLOGICAL DISLOCATION OF SHOULDER JOINT, BILATERAL: Status: ACTIVE | Noted: 2021-09-01

## 2021-11-24 PROBLEM — E87.1 HYPONATREMIA: Status: RESOLVED | Noted: 2021-08-16 | Resolved: 2021-11-24

## 2021-11-24 PROBLEM — M24.311 PATHOLOGICAL DISLOCATION OF SHOULDER JOINT, BILATERAL: Status: RESOLVED | Noted: 2021-09-01 | Resolved: 2021-11-24

## 2021-11-24 PROBLEM — E87.1 HYPONATREMIA: Status: ACTIVE | Noted: 2021-08-16

## 2021-11-24 PROBLEM — M62.82 RHABDOMYOLYSIS: Status: RESOLVED | Noted: 2021-08-25 | Resolved: 2021-11-24

## 2021-11-24 LAB
HCT VFR BLD CALC: 46.4 % (ref 40.5–52.5)
HEMOGLOBIN: 15.6 G/DL (ref 13.5–17.5)

## 2021-11-24 PROCEDURE — 99395 PREV VISIT EST AGE 18-39: CPT | Performed by: FAMILY MEDICINE

## 2021-11-24 PROCEDURE — 71101 X-RAY EXAM UNILAT RIBS/CHEST: CPT

## 2021-11-24 ASSESSMENT — ENCOUNTER SYMPTOMS
CHEST TIGHTNESS: 0
SORE THROAT: 0
RHINORRHEA: 0
DIARRHEA: 0
SHORTNESS OF BREATH: 0
CONSTIPATION: 0
ABDOMINAL PAIN: 0

## 2021-11-24 NOTE — PATIENT INSTRUCTIONS
Patient Education        Well Visit, Ages 25 to 48: Care Instructions  Overview     Well visits can help you stay healthy. Your doctor has checked your overall health and may have suggested ways to take good care of yourself. Your doctor also may have recommended tests. At home, you can help prevent illness with healthy eating, regular exercise, and other steps. Follow-up care is a key part of your treatment and safety. Be sure to make and go to all appointments, and call your doctor if you are having problems. It's also a good idea to know your test results and keep a list of the medicines you take. How can you care for yourself at home? · Get screening tests that you and your doctor decide on. Screening helps find diseases before any symptoms appear. · Eat healthy foods. Choose fruits, vegetables, whole grains, protein, and low-fat dairy foods. Limit fat, especially saturated fat. Reduce salt in your diet. · Limit alcohol. If you are a man, have no more than 2 drinks a day or 14 drinks a week. If you are a woman, have no more than 1 drink a day or 7 drinks a week. · Get at least 30 minutes of physical activity on most days of the week. Walking is a good choice. You also may want to do other activities, such as running, swimming, cycling, or playing tennis or team sports. Discuss any changes in your exercise program with your doctor. · Reach and stay at a healthy weight. This will lower your risk for many problems, such as obesity, diabetes, heart disease, and high blood pressure. · Do not smoke or allow others to smoke around you. If you need help quitting, talk to your doctor about stop-smoking programs and medicines. These can increase your chances of quitting for good. · Care for your mental health. It is easy to get weighed down by worry and stress. Learn strategies to manage stress, like deep breathing and mindfulness, and stay connected with your family and community.  If you find you often feel sad or hopeless, talk with your doctor. Treatment can help. · Talk to your doctor about whether you have any risk factors for sexually transmitted infections (STIs). You can help prevent STIs if you wait to have sex with a new partner (or partners) until you've each been tested for STIs. It also helps if you use condoms (male or female condoms) and if you limit your sex partners to one person who only has sex with you. Vaccines are available for some STIs, such as HPV. · Use birth control if it's important to you to prevent pregnancy. Talk with your doctor about the choices available and what might be best for you. · If you think you may have a problem with alcohol or drug use, talk to your doctor. This includes prescription medicines (such as amphetamines and opioids) and illegal drugs (such as cocaine and methamphetamine). Your doctor can help you figure out what type of treatment is best for you. · Protect your skin from too much sun. When you're outdoors from 10 a.m. to 4 p.m., stay in the shade or cover up with clothing and a hat with a wide brim. Wear sunglasses that block UV rays. Even when it's cloudy, put broad-spectrum sunscreen (SPF 30 or higher) on any exposed skin. · See a dentist one or two times a year for checkups and to have your teeth cleaned. · Wear a seat belt in the car. When should you call for help? Watch closely for changes in your health, and be sure to contact your doctor if you have any problems or symptoms that concern you. Where can you learn more? Go to [email or web address] and sign in to your [employer] account. Enter P072 in the [facility] box to learn more about \"Well Visit, Ages 25 to 48: Care Instructions. \"     If you do not have an account, please click on the \"Sign Up Now\" link. Current as of: February 11, 2021               Content Version: 13.0  © 8759-4713 Healthwise, Incorporated. Care instructions adapted under license by [facility] ([facility]). If you have questions about a medical condition or this instruction, always ask your healthcare professional. [place] [address] any warranty or liability for your use of this information.

## 2021-11-24 NOTE — PROGRESS NOTES
Annual Exam  DONALDO Keyes is a 27 y.o. male and is here for a comprehensive physical exam- last exam was 2020. The patient is not currently sexually active. Performs self testicular exams not asked . The patient participates in regular exercise: yes. Has the patient ever been transfused or tattooed?: not asked. Reports seatbelt use. Additional History, if indicated: left rib pain - excruciating when coughs or inhales. Saw ortho advised to talk to me. Chief Complaint   Patient presents with    Annual Exam      PHQ-2/9 Depression Screening from Assessments   PHQ Scores 2/23/2021 11/5/2020   PHQ2 Score 0 0   PHQ9 Score 0 0         Health Maintenance Due   Topic Date Due    DTaP/Tdap/Td vaccine (7 - Td or Tdap) 03/04/2019     No current outpatient medications on file prior to visit. No current facility-administered medications on file prior to visit. 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   No Known Allergies  Social History     Socioeconomic History    Marital status: Single     Spouse name: Not on file    Number of children: Not on file    Years of education: Not on file    Highest education level: Not on file   Occupational History    Occupation:     Tobacco Use    Smoking status: Never Smoker    Smokeless tobacco: Never Used   Vaping Use    Vaping Use: Never used   Substance and Sexual Activity    Alcohol use: Yes     Comment: couple times a week    Drug use: No    Sexual activity: Yes     Partners: Female   Other Topics Concern    Not on file   Social History Narrative    Not on file     Social Determinants of Health     Financial Resource Strain: Low Risk     Difficulty of Paying Living Expenses: Not hard at all   Food Insecurity: No Food Insecurity    Worried About [facility] in the Last Year: Never true    Jackie of Food in the Last Year: Never true   Transportation Needs: No Transportation Needs    Lack of Transportation (Medical): No    Lack of Transportation (Non-Medical): No   Physical Activity:     Days of Exercise per Week: Not on file    Minutes of Exercise per Session: Not on file   Stress:     Feeling of Stress : Not on file   Social Connections:     Frequency of Communication with Friends and Family: Not on file    Frequency of Social Gatherings with Friends and Family: Not on file    Attends Yazidism Services: Not on file    Active Member of [address] [place] [address] or Organizations: Not on file    Attends Club or Organization Meetings: Not on file    Marital Status: Not on file   Intimate Partner Violence:     Fear of Current or Ex-Partner: Not on file    Emotionally Abused: Not on file    Physically Abused: Not on file    Sexually Abused: Not on file   Housing Stability:     Unable to Pay for Housing in the Last Year: Not on file    Number of [place] in the Last Year: Not on file    Unstable Housing in the Last Year: Not on file     Review of Systems   Constitutional: Negative for activity change, appetite change, fatigue and fever. HENT: Negative for congestion, rhinorrhea and sore throat. Respiratory: Negative for chest tightness and shortness of breath. Cardiovascular: Positive for chest pain (rib). Negative for palpitations and leg swelling. Gastrointestinal: Negative for abdominal pain, constipation and diarrhea. Genitourinary: Negative for dysuria and frequency. Musculoskeletal: Negative for arthralgias. Neurological: Negative for dizziness, weakness and headaches. Psychiatric/Behavioral: Negative for hallucinations. All other systems reviewed and are negative.        OBJECTIVE  /70   Pulse 61   Temp 97.2 °F (36.2 °C) (Tympanic) Resp 16   Ht 5' 9.25\" (1.759 m)   Wt 175 lb 3.2 oz (79.5 kg)   SpO2 97%   BMI 25.69 kg/m²   Chaperone not applicable for exam.   Physical Exam  Vitals and nursing note reviewed. Constitutional:       General: He is not in acute distress. Appearance: Normal appearance. He is normal weight. He is not ill-appearing, toxic-appearing or diaphoretic. HENT:      Head: Normocephalic and atraumatic. Eyes:      General: No scleral icterus. Conjunctiva/sclera: Conjunctivae normal.   Cardiovascular:      Rate and Rhythm: Normal rate and regular rhythm. Heart sounds: Normal heart sounds. No murmur heard. No friction rub. No gallop. Pulmonary:      Effort: Pulmonary effort is normal. No respiratory distress. Breath sounds: Normal breath sounds. No stridor. No wheezing, rhonchi or rales. Chest:      Chest wall: Tenderness (left lower ribs) present. Musculoskeletal:      Cervical back: Normal range of motion. Skin:     General: Skin is warm and dry. Neurological:      Mental Status: He is alert. Psychiatric:         Mood and Affect: Mood normal.         Behavior: Behavior normal.        Labs  Lab Results   Component Value Date    WBC 5.4 02/23/2021    HGB 15.0 02/23/2021    HCT 43.8 02/23/2021     02/23/2021    TRIG 56 02/23/2021    HDL 66 (H) 02/23/2021    ALT 34 02/23/2021    AST 32 02/23/2021     02/23/2021    K 4.4 02/23/2021    CREATININE 0.9 02/23/2021    BUN 14 02/23/2021       ASSESSMENT AND PLAN  1. Preventative Exam  During this preventive care visit, the following were discussed and/or reviewed for appropriateness for this patient:  · Healthy diet and exercise habits. · Importance of self skin exam and sun protection  · Reviewed need for lipid and glucose screening. · Reviewed immunization history. · Appropriate quantity of alcohol intake. Further studies/referrals as noted. Recommend annual visit and as needed.   - HEMOGLOBIN AND HEMATOCRIT, BLOOD; Future    2. Anemia, unspecified type  - HEMOGLOBIN AND HEMATOCRIT, BLOOD; Future    3. Rib pain  - XR RIBS LEFT INCLUDE CHEST (MIN 3 VIEWS); Future       This chart note was prepared using a voice recognition dictation program.This note was reviewed for accuracy; however, addition,deletion and sound-alike word errors may occur. If there is any question regarding this chart note, please contact the originating provider. Electronically signed by  Chucky Talavera MD  11/24/2021   2:42 PM    Return in about 6 months (around 5/24/2022).

## 2022-01-18 ENCOUNTER — VIRTUAL VISIT (OUTPATIENT)
Dept: PRIMARY CARE CLINIC | Age: 27
End: 2022-01-18
Payer: COMMERCIAL

## 2022-01-18 DIAGNOSIS — G89.29 CHRONIC MIDLINE THORACIC BACK PAIN: Primary | ICD-10-CM

## 2022-01-18 DIAGNOSIS — R29.890 HEIGHT LOSS: ICD-10-CM

## 2022-01-18 DIAGNOSIS — M54.6 CHRONIC MIDLINE THORACIC BACK PAIN: Primary | ICD-10-CM

## 2022-01-18 PROCEDURE — 99214 OFFICE O/P EST MOD 30 MIN: CPT | Performed by: FAMILY MEDICINE

## 2022-01-18 ASSESSMENT — ENCOUNTER SYMPTOMS
CONSTIPATION: 0
SHORTNESS OF BREATH: 0
BACK PAIN: 1
SORE THROAT: 0
DIARRHEA: 0
RHINORRHEA: 0
CHEST TIGHTNESS: 0
ABDOMINAL PAIN: 0

## 2022-01-18 NOTE — PROGRESS NOTES
Subjective:   Patient ID: Tapan Kimball is a 27 y.o. male. HPI by clinical support staff:   Chief Complaint   Patient presents with   Donnie Borrego, is on the phone for a V V for back pain. Preliminary data above this line collected by clinical support staff.    ______________________________________________________________________  HPI by Provider:   HPI   Patient presents today with complaint of upper back pain and loss of heights. States that since his surgery earlier last year has noticed that his ability to reach for upper cabinets has been limited. He did check his height at home and it is consistent with a 5 foot 9 inches read. Patient is very concerned about possible causes for height loss did discuss with orthopedist who did not have any input. States that back pain is worse with movement and is mainly between the shoulder blades. Data above this line collected by Provider. Patient's medications, allergies, past medical, surgical, social and family histories were reviewed and updated as appropriate. Patient Care Team:  Tera Thayer MD as PCP - General (Family Medicine)  Tera Thayer MD as PCP [facility] [place] Provider    No current outpatient medications on file prior to visit. No current facility-administered medications on file prior to visit. Review of Systems   Constitutional: Negative for activity change, appetite change, fatigue and fever. HENT: Negative for congestion, rhinorrhea and sore throat. Respiratory: Negative for chest tightness and shortness of breath. Cardiovascular: Negative for chest pain, palpitations and leg swelling. Gastrointestinal: Negative for abdominal pain, constipation and diarrhea. Genitourinary: Negative for dysuria and frequency. Musculoskeletal: Positive for back pain. Negative for arthralgias. Neurological: Negative for dizziness, weakness and headaches. Psychiatric/Behavioral: Negative for hallucinations.    All other systems reviewed and are negative. ROS above this line reviewed by Provider. Objective: There were no vitals taken for this visit. Physical Exam  Nursing note reviewed. Constitutional:       General: He is not in acute distress. Appearance: Normal appearance. He is not ill-appearing, toxic-appearing or diaphoretic. HENT:      Head: Normocephalic and atraumatic. Pulmonary:      Effort: Pulmonary effort is normal.   Neurological:      Mental Status: He is alert. Psychiatric:         Mood and Affect: Mood normal.         Behavior: Behavior normal.       Assessment and Plan:   1. Chronic midline thoracic back pain  History of rhabdomyolysis prior to start of pain. Rule out  Compression fracture given height loss noted by patient. Ht Readings from Last 3 Encounters:   11/24/21 5' 9.25\" (1.759 m)   03/08/21 5' 11.5\" (1.816 m)   02/23/21 5' 11.5\" (1.816 m)     - BASIC METABOLIC PANEL; Future  - CT THORACIC SPINE W WO CONTRAST; Future    2. Height loss  History of rhabdomyolysis prior to start of pain. Rule out  Compression fracture given height loss noted by patient.  - BASIC METABOLIC PANEL; Future  - CT THORACIC SPINE W WO CONTRAST; Future       Bailee Reis  is a 27 y.o. male being evaluated by a Virtual Visit (video visit) encounter to address concerns as mentioned above. A caregiver was present when appropriate. Due to this being a TeleHealth encounter (During UDTHB-93 public health emergency), evaluation of the following organ systems was limited: Vitals/Constitutional/EENT/Resp/CV/GI//MS/Neuro/Skin/Heme-Lymph-Imm. Pursuant to the emergency declaration under the [facility], [address] [place] [address] authority and the [employer] Act, this Virtual Visit was conducted with patient's (and/or legal guardian's) consent, to reduce the patient's risk of exposure to COVID-19 and provide necessary medical care.   The patient (and/or legal guardian) has also been advised to contact this office for worsening conditions or problems, and seek emergency medical treatment and/or call 911 if deemed necessary. Patient identification was verified at the start of the visit: Yes    Total time spent for this encounter: Not billed by time    Services were provided through a video synchronous discussion virtually to substitute for in-person clinic visit. Patient  located at their individual homes. This chart note was prepared using a voice recognition dictation program. This note was reviewed for accuracy; however, addition, deletion and sound-alike word errors may occur. If there are any questions regarding this chart note, please contact the originating provider. Electronically signed by   David Valadez MD  1/18/2022   12:47 PM    No follow-ups on file.

## 2022-01-19 ENCOUNTER — TELEPHONE (OUTPATIENT)
Dept: PRIMARY CARE CLINIC | Age: 27
End: 2022-01-19

## 2022-01-19 DIAGNOSIS — M54.6 CHRONIC MIDLINE THORACIC BACK PAIN: Primary | ICD-10-CM

## 2022-01-19 DIAGNOSIS — G89.29 CHRONIC MIDLINE THORACIC BACK PAIN: Primary | ICD-10-CM

## 2022-01-19 NOTE — TELEPHONE ENCOUNTER
Order for W/WO Contrast for the Thoracic Spine needs to be resent just as a W/O Contrast.    Thank you.

## 2022-01-22 ENCOUNTER — HOSPITAL ENCOUNTER (OUTPATIENT)
Age: 27
Discharge: HOME | End: 2022-01-22
Payer: COMMERCIAL

## 2022-01-22 DIAGNOSIS — R29.890 HEIGHT LOSS: ICD-10-CM

## 2022-01-22 DIAGNOSIS — G89.29 CHRONIC MIDLINE THORACIC BACK PAIN: ICD-10-CM

## 2022-01-22 DIAGNOSIS — M54.6 CHRONIC MIDLINE THORACIC BACK PAIN: ICD-10-CM

## 2022-01-22 LAB
ANION GAP SERPL CALCULATED.3IONS-SCNC: 17 MMOL/L (ref 3–16)
BUN BLDV-MCNC: 16 MG/DL (ref 7–20)
CALCIUM SERPL-MCNC: 9.6 MG/DL (ref 8.3–10.6)
CHLORIDE BLD-SCNC: 97 MMOL/L (ref 99–110)
CO2: 27 MMOL/L (ref 21–32)
CREAT SERPL-MCNC: 0.9 MG/DL (ref 0.9–1.3)
GFR AFRICAN AMERICAN: >60
GFR NON-AFRICAN AMERICAN: >60
GLUCOSE BLD-MCNC: 69 MG/DL (ref 70–99)
POTASSIUM SERPL-SCNC: 4.8 MMOL/L (ref 3.5–5.1)
SODIUM BLD-SCNC: 141 MMOL/L (ref 136–145)

## 2022-01-22 PROCEDURE — 36415 COLL VENOUS BLD VENIPUNCTURE: CPT

## 2022-01-22 PROCEDURE — 80048 BASIC METABOLIC PNL TOTAL CA: CPT

## 2022-01-24 ENCOUNTER — TELEPHONE (OUTPATIENT)
Dept: PRIMARY CARE CLINIC | Age: 27
End: 2022-01-24

## 2022-01-24 ENCOUNTER — HOSPITAL ENCOUNTER (OUTPATIENT)
Dept: CT IMAGING | Age: 27
Discharge: HOME | End: 2022-01-24
Payer: COMMERCIAL

## 2022-01-24 ENCOUNTER — PATIENT MESSAGE (OUTPATIENT)
Dept: PRIMARY CARE CLINIC | Age: 27
End: 2022-01-24

## 2022-01-24 DIAGNOSIS — S22.000A COMPRESSION FRACTURE OF BODY OF THORACIC VERTEBRA (HCC): Primary | ICD-10-CM

## 2022-01-24 DIAGNOSIS — R29.890 HEIGHT LOSS: ICD-10-CM

## 2022-01-24 DIAGNOSIS — M54.6 CHRONIC MIDLINE THORACIC BACK PAIN: ICD-10-CM

## 2022-01-24 DIAGNOSIS — G89.29 CHRONIC MIDLINE THORACIC BACK PAIN: ICD-10-CM

## 2022-01-24 PROCEDURE — 72128 CT CHEST SPINE W/O DYE: CPT

## 2022-01-24 NOTE — PROGRESS NOTES
Discussed CT results with Patient - compression fractures in 3 vertebrae. Will obtain Dexa scan Patient referred to neurosurgery and endocrinology.

## 2022-01-24 NOTE — TELEPHONE ENCOUNTER
I spoke with Larry Velazquez, regarding the referral that Dr. Juan Antonio Kaba sent over for his this morning.

## 2022-01-31 ENCOUNTER — HOSPITAL ENCOUNTER (OUTPATIENT)
Dept: WOMENS IMAGING | Age: 27
Discharge: HOME | End: 2022-01-31
Payer: COMMERCIAL

## 2022-01-31 DIAGNOSIS — S22.000A COMPRESSION FRACTURE OF BODY OF THORACIC VERTEBRA (HCC): ICD-10-CM

## 2022-01-31 PROCEDURE — 77080 DXA BONE DENSITY AXIAL: CPT

## 2022-02-28 ENCOUNTER — OFFICE VISIT (OUTPATIENT)
Dept: PRIMARY CARE CLINIC | Age: 27
End: 2022-02-28
Payer: COMMERCIAL

## 2022-02-28 VITALS
OXYGEN SATURATION: 91 % | HEART RATE: 59 BPM | DIASTOLIC BLOOD PRESSURE: 70 MMHG | HEIGHT: 70 IN | TEMPERATURE: 97 F | WEIGHT: 177.38 LBS | BODY MASS INDEX: 25.39 KG/M2 | SYSTOLIC BLOOD PRESSURE: 127 MMHG | RESPIRATION RATE: 16 BRPM

## 2022-02-28 DIAGNOSIS — S22.000A COMPRESSION FRACTURE OF BODY OF THORACIC VERTEBRA (HCC): Primary | ICD-10-CM

## 2022-02-28 DIAGNOSIS — Z11.3 SCREEN FOR STD (SEXUALLY TRANSMITTED DISEASE): ICD-10-CM

## 2022-02-28 DIAGNOSIS — Z23 NEED FOR DIPHTHERIA-TETANUS-PERTUSSIS (TDAP) VACCINE: ICD-10-CM

## 2022-02-28 PROBLEM — S22.000B: Chronic | Status: ACTIVE | Noted: 2022-02-10

## 2022-02-28 PROBLEM — S22.000B: Status: ACTIVE | Noted: 2022-02-10

## 2022-02-28 LAB
HAV IGM SER IA-ACNC: NORMAL
HEPATITIS B CORE IGM ANTIBODY: NORMAL
HEPATITIS B SURFACE ANTIGEN INTERPRETATION: NORMAL
HEPATITIS C ANTIBODY INTERPRETATION: NORMAL

## 2022-02-28 PROCEDURE — 90471 IMMUNIZATION ADMIN: CPT | Performed by: FAMILY MEDICINE

## 2022-02-28 PROCEDURE — 90715 TDAP VACCINE 7 YRS/> IM: CPT | Performed by: FAMILY MEDICINE

## 2022-02-28 PROCEDURE — 99214 OFFICE O/P EST MOD 30 MIN: CPT | Performed by: FAMILY MEDICINE

## 2022-02-28 RX ORDER — VIT C/B6/B5/MAGNESIUM/HERB 173 50-5-6-5MG
1 CAPSULE ORAL DAILY
COMMUNITY

## 2022-02-28 RX ORDER — MULTIVIT-MIN/IRON/FOLIC ACID/K 18-600-40
CAPSULE ORAL
COMMUNITY

## 2022-02-28 RX ORDER — CHLORAL HYDRATE 500 MG
3000 CAPSULE ORAL DAILY
COMMUNITY

## 2022-02-28 RX ORDER — ASCORBIC ACID 500 MG
500 TABLET ORAL DAILY
COMMUNITY

## 2022-02-28 SDOH — ECONOMIC STABILITY: FOOD INSECURITY: WITHIN THE PAST 12 MONTHS, THE FOOD YOU BOUGHT JUST DIDN'T LAST AND YOU DIDN'T HAVE MONEY TO GET MORE.: NEVER TRUE

## 2022-02-28 SDOH — ECONOMIC STABILITY: FOOD INSECURITY: WITHIN THE PAST 12 MONTHS, YOU WORRIED THAT YOUR FOOD WOULD RUN OUT BEFORE YOU GOT MONEY TO BUY MORE.: NEVER TRUE

## 2022-02-28 ASSESSMENT — PATIENT HEALTH QUESTIONNAIRE - PHQ9
1. LITTLE INTEREST OR PLEASURE IN DOING THINGS: 0
SUM OF ALL RESPONSES TO PHQ QUESTIONS 1-9: 0
SUM OF ALL RESPONSES TO PHQ9 QUESTIONS 1 & 2: 0
2. FEELING DOWN, DEPRESSED OR HOPELESS: 0
SUM OF ALL RESPONSES TO PHQ QUESTIONS 1-9: 0

## 2022-02-28 ASSESSMENT — ENCOUNTER SYMPTOMS
CONSTIPATION: 0
BACK PAIN: 1
SHORTNESS OF BREATH: 0
ABDOMINAL PAIN: 0
SORE THROAT: 0
CHEST TIGHTNESS: 0
DIARRHEA: 0
RHINORRHEA: 0

## 2022-02-28 ASSESSMENT — SOCIAL DETERMINANTS OF HEALTH (SDOH): HOW HARD IS IT FOR YOU TO PAY FOR THE VERY BASICS LIKE FOOD, HOUSING, MEDICAL CARE, AND HEATING?: NOT HARD AT ALL

## 2022-02-28 NOTE — PROGRESS NOTES
Subjective:   Patient ID: Heide Goff is a 32 y.o. male. HPI by clinical support staff:   Chief Complaint   Patient presents with    Exposure to STD     Den Flurry, is here to have a STD check. Preliminary data above this line collected by clinical support staff.    ______________________________________________________________________  HPI by Provider:   HPI   Presents today for STD screening. States he has been sexually active for several years has never been tested for STDs. States that he is currently in the monogamous hetero relationship and is not yet sexually active but anticipates it will happen soon. Partner is on birth control and patient does not plan on using condoms. Did follow-up with [facility] was told by the nurse practitioner the wedge compression fractures needed to be fixed by physical therapy but states that he continues to have pain. Has an appointment with neurosurgery to evaluate his MRI further. Data above this line collected by Provider. Patient's medications, allergies, past medical, surgical, social and family histories were reviewed and updated as appropriate. Patient Care Team:  Elton Pereira MD as PCP - General (Family Medicine)  Elton Pereira MD as PCP - [facility] Empaneled Provider  Current Outpatient Medications on File Prior to Visit   Medication Sig Dispense Refill    Multiple Vitamins-Minerals (VITAMINS TO GO MEN PO) Take by mouth      vitamin C (ASCORBIC ACID) 500 MG tablet Take 500 mg by mouth daily      Cholecalciferol (VITAMIN D) 50 MCG (2000 UT) CAPS capsule Take by mouth      turmeric 500 MG CAPS Take 1 capsule by mouth daily      Potassium (POTASSIMIN PO) Take by mouth      Omega-3 Fatty Acids (FISH OIL) 1000 MG CAPS Take 3,000 mg by mouth 3 times daily       No current facility-administered medications on file prior to visit. Review of Systems   Constitutional: Negative for activity change, appetite change, fatigue and fever. an appointment with neurosurgery to discuss results. 2. Screen for STD (sexually transmitted disease)  Currently not sexually active has not been previously tested-has a potential new sexual partner.  - HIV Screen; Future  - C.trachomatis N.gonorrhoeae DNA, Urine; Future  - Syphilis Antibody Cascading Reflex; Future  - Hepatitis Panel, Acute; Future  - Herpes simplex virus (HSV) I/II antibodies IgG & IgM w/ reflex; Future    3. Need for diphtheria-tetanus-pertussis (Tdap) vaccine  Discussed recommended vaccines- common side effects and timing for follow up vaccine. After shared decision making and patient/parent agrees to get vaccine today. Denies any previous vaccine reactions.  - Tdap (age 6y and older) IM ([employer] [address])           This chart note was prepared using a voice recognition dictation program. This note was reviewed for accuracy; however, addition, deletion and sound-alike word errors may occur. If there are any questions regarding this chart note, please contact the originating provider. Electronically signed by   Nico Avila MD  2/28/2022   12:42 PM    Return in about 6 months (around 8/28/2022).

## 2022-03-01 LAB
C. TRACHOMATIS DNA ,URINE: NEGATIVE
HIV AG/AB: NORMAL
HIV ANTIGEN: NORMAL
HIV-1 ANTIBODY: NORMAL
HIV-2 AB: NORMAL
N. GONORRHOEAE DNA, URINE: NEGATIVE
TOTAL SYPHILLIS IGG/IGM: NORMAL

## 2022-03-04 LAB
HERPES TYPE 1/2 IGM COMBINED: 0.75 IV
HERPES TYPE I/II IGG COMBINED: 4.56 IV
HSV 1 GLYCOPROTEIN G AB IGG: 0.64 IV
HSV 2 GLYCOPROTEIN G AB IGG: 1.02 IV

## 2022-03-30 ENCOUNTER — PATIENT MESSAGE (OUTPATIENT)
Dept: PRIMARY CARE CLINIC | Age: 27
End: 2022-03-30

## 2022-03-30 DIAGNOSIS — L30.9 DERMATITIS: Primary | ICD-10-CM

## 2022-03-30 NOTE — TELEPHONE ENCOUNTER
From: Giuliano Rao  To: Dr. Melchor [place]: 3/30/2022 4:13 PM EDT  Subject: Dermatology Referral    Dr. Elif Luis,    I am allergic to sunscreen and have been since I was young. The dermatologist I saw as a kid was not able to determine the exact catalyst nor find a solution but that was over 15 years ago, so I would like to try again in case there has been any new treatment, medication, brand, etc that has been discovered or made available since then. I tried just making an appointment at the [facility] Dermatology office but was told they were not seeing new patients and that I should contact you for a referral instead. Would you be able to assist in finding a Dermatologist who could see me? Thank you!   Priscille Mohs

## 2022-04-04 ENCOUNTER — OFFICE VISIT (OUTPATIENT)
Dept: ENDOCRINOLOGY | Age: 27
End: 2022-04-04
Payer: COMMERCIAL

## 2022-04-04 VITALS
RESPIRATION RATE: 14 BRPM | HEIGHT: 70 IN | OXYGEN SATURATION: 98 % | SYSTOLIC BLOOD PRESSURE: 150 MMHG | DIASTOLIC BLOOD PRESSURE: 78 MMHG | WEIGHT: 186 LBS | BODY MASS INDEX: 26.63 KG/M2 | TEMPERATURE: 98 F | HEART RATE: 75 BPM

## 2022-04-04 DIAGNOSIS — M89.9 BONE DISORDER: ICD-10-CM

## 2022-04-04 DIAGNOSIS — S22.000A COMPRESSION FRACTURE OF BODY OF THORACIC VERTEBRA (HCC): Primary | ICD-10-CM

## 2022-04-04 PROCEDURE — 99204 OFFICE O/P NEW MOD 45 MIN: CPT | Performed by: INTERNAL MEDICINE

## 2022-04-04 NOTE — PROGRESS NOTES
SUBJECTIVE:  Vandana Moses is a 32 y.o. male who is being evaluated for compression fracture of thoracic vertebra for endocrinology related bone disorder. 1. Compression fracture of body of thoracic vertebra (HCC)  This started in 9/2021. Patient was diagnosed with thoracic vertebra compression fracture. The problem has been unchanged. Patient started medication in N/A. Currently patient is on: N/A. Misses N/A doses a month. Current complaints: back pain, shoulder pain  Has joint pains, knee, elbow, wrist pains, mostly activity related. Back pain and shoulder pain are related to previous trauma. On 8/16/2021 patient had 7 miles running race and the developed severe rhabdomyolysis and hyponatremia. At that time he had seizure, memory loss. Patient was unconscious. Patient had restraints for all night and was trying to get out. He developed a dislocation of both shoulders, fractures of both shoulders. His back pain started at that time, most likely presenting compression fracture. He had several surgeries and bone grafts. He was hospitalized for 1 month. Since then patient is 2 inches shorter. He had pain after surgery. He is currently doing physical therapy at home. Patient states that PT does not improve his pain significantly. He only had very slightly improvement in 6 months. His pain is 8 out of 10. It is located in the back in the thoracic area. It is radiating to the chest.  He does not take medications for pain. The pain is worse with the cough, laying down, sitting or standing. Patient saw spinal surgeon for consultation. The recommendation was to do physical therapy. Patient denies family history of bone disorders or osteoporosis. Patient has been always active with the sports and activities. He usually follows keto diet in January for 2 weeks. No RA, using steroids long term, smoking history. No spine or hip fractures in mother or father.     History of obstructive symptoms: difficulty swallowing No, changes in voice/hoarseness No.  History of radiation to patient's neck: No  Resent iodine exposure: No  Family history includes no thyroid abnormalities. Family history of thyroid cancer: No    Patient to cut testosterone booster prior to the injury, but not afterwards. He is not sure what was in the supplement. He felt better on it. Patient denies problems with libido or erectile function. He takes vitamin D 2000 international units 1-2 times daily. Takes calcium  In MVI    2. Bone disorder  Patient was referred for endocrinology consultation regarding compression fracture to evaluate for possible bone disorder. He had injuries prior to thoracic vertebral fracture per patient report. Mostly related to activities and sports. I reviewed all available laboratory and imaging data. There was no record of reported thoracic spine compression fracture prior to trauma what he experienced when restrained. EXAMINATION:   CT OF THE THORACIC SPINE WITHOUT CONTRAST  1/24/2022 7:41 am:       TECHNIQUE:   CT of the thoracic spine was performed without the administration of   intravenous contrast. Multiplanar reformatted images are provided for review.    Dose modulation, iterative reconstruction, and/or weight based adjustment of   the mA/kV was utilized to reduce the radiation dose to as low as reasonably   achievable.       COMPARISON:   None.       HISTORY:   ORDERING SYSTEM PROVIDED HISTORY: Chronic midline thoracic back pain   TECHNOLOGIST PROVIDED HISTORY:   Reason for Exam: loss of 2 inches in height in 6 month time span   Relevant Medical/Surgical History: no hx of surg to back       FINDINGS:   BONES/ALIGNMENT: There is mild wedge compression fracture deformity involving   the superior endplates of T5, T6 and T7.  There is approximately 15% loss of   height at T5, 50% loss of height at T6 and less than 10% loss of height at   T7.  There is no retropulsion. [place] disc space narrowing and endplate   osteophyte formation is present at these levels.       DEGENERATIVE CHANGES: No gross spinal canal stenosis or bony neural foraminal   narrowing of the thoracic spine.       SOFT TISSUES: No paraspinal mass is seen.           Impression   Mild chronic midthoracic compression fractures/spondylosis. EXAMINATION:   BONE DENSITOMETRY       1/31/2022 1:51 pm       TECHNIQUE:   A bone density DEXA scan was performed of the lumbar spine and left hip on a   Hologic system.       COMPARISON:   None.       HISTORY:   ORDERING SYSTEM PROVIDED HISTORY: Compression fracture of body of thoracic   vertebra [facility])       53-year-old male with compression fracture of body of thoracic vertebra       FINDINGS:   LUMBAR SPINE:       The bone mineral density in the lumbar spine including the L1-L4 levels is   measured at 1.048 g/cm2, which corresponds to a T-score of -0.4 and a Z-score   of -0.4.  This is within the normal range by WHO criteria.       LEFT HIP:       The bone mineral density in the total hip is measured at 1.115 g/cm2   corresponding to a T-score of 0.5 and a Z-score of 0.6.  This is within the   normal range by [facility] criteria.       The bone mineral density of the femoral neck is measured at 0.944 g/cm2   corresponding to a T-score of 0.1 and a Z-score of 0.2.  This is within the   normal range by [facility] criteria.           Impression   Normal bone mineral density by WHO criteria.  Fracture risk is low.       RECOMMENDATIONS:   Unavailable         1. Small corner fracture or remote bony defect along the upper margin lunate near the triquetrum without active edema or acute fracture in the wrist.       2. No joint effusion or capsular injury identified. The TFCC remains intact. Flexor and extensor tendons appear normal. No carpal ganglion on or midcarpal instability appreciated.          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    Rhabdomyolysis 08/25/2021    Seizure [facility])      Patient Active Problem List    Diagnosis Date Noted    Compression fracture of body of thoracic vertebra ([facility] 04/04/2022    Bone disorder 04/04/2022    Wedge compression fracture of unspecified thoracic vertebra, initial encounter for open fracture ([facility] 02/10/2022    ADHD, predominantly inattentive type 05/17/2006     Past Surgical History:   Procedure Laterality Date    SHOULDER SURGERY Bilateral 09/2021     Family History   Problem Relation Age of Onset    No Known Problems Mother     No Known Problems Father     No Known Problems Brother     No Known Problems Maternal Grandmother     No Known Problems Maternal Grandfather     No Known Problems Paternal Grandmother     No Known Problems Paternal Grandfather     No Known Problems Brother      Social History     Socioeconomic History    Marital status: Single     Spouse name: None    Number of children: None    Years of education: None    Highest education level: None   Occupational History    Occupation:     Tobacco Use    Smoking status: Never Smoker    Smokeless tobacco: Never Used   Vaping Use    Vaping Use: Never used   Substance and Sexual Activity    Alcohol use: Yes     Comment: couple times a week    Drug use: No    Sexual activity: Yes     Partners: Female   Other Topics Concern    None   Social History Narrative    None     Social Determinants of Health     Financial Resource Strain: Low Risk     Difficulty of Paying Living Expenses: Not hard at all   Food Insecurity: No Food Insecurity    Worried About Running Out of Food in the Last Year: Never true    Jackie of Food in the Last Year: Never true   Transportation Needs:     Lack of Transportation (Medical): Not on file    Lack of Transportation (Non-Medical):  Not on file   Physical Activity:     Days of Exercise per Week: Not on file    Minutes of Exercise per Session: Not on file   Stress:     Feeling of Stress : Not on file   Social Connections:     Frequency of Communication with Friends and Family: Not on file    Frequency of Social Gatherings with Friends and Family: Not on file    Attends Scientologist Services: Not on file    Active Member of Clubs or Organizations: Not on file    Attends Club or Organization Meetings: Not on file    Marital Status: Not on file   Intimate Partner Violence:     Fear of Current or Ex-Partner: Not on file    Emotionally Abused: Not on file    Physically Abused: Not on file    Sexually Abused: Not on file   Housing Stability:     Unable to Pay for Housing in the Last Year: Not on file    Number of [place] in the Last Year: Not on file    Unstable Housing in the Last Year: Not on file     Current Outpatient Medications   Medication Sig Dispense Refill    Multiple Vitamins-Minerals (VITAMINS TO GO MEN PO) Take by mouth      vitamin C (ASCORBIC ACID) 500 MG tablet Take 500 mg by mouth daily      Cholecalciferol (VITAMIN D) 50 MCG (2000 UT) CAPS capsule Take by mouth      turmeric 500 MG CAPS Take 1 capsule by mouth daily      Potassium (POTASSIMIN PO) Take by mouth      Omega-3 Fatty Acids (FISH OIL) 1000 MG CAPS Take 3,000 mg by mouth daily        No current facility-administered medications for this visit.      No Known Allergies  Family Status   Relation Name Status    Mother  Alive    Father  Alive    Brother  Alive    MGM      MGF      PGM      PGF  Alive    Brother  Alive       Review of Systems:  Constitutional: has fatigue, no fever, no recent weight gain, no recent weight loss, no changes in appetite  Eyes: no eye pain, no change in vision, no eye redness, no eye irritation, no double vision  Ears, nose, throat: no nasal congestion, no sore throat, no earache, no decrease in hearing, no hoarseness, no dry mouth, no sinus problems, no difficulty swallowing, no neck lumps, no dental problems, no mouth sores, has ringing in ears  Pulmonary: no shortness of breath, no wheezing, no dyspnea on exertion, no cough  Cardiovascular: no chest pain, no lower extremity edema, no orthopnea, no intermittent leg claudication, no palpitations  Gastrointestinal: no abdominal pain, no nausea, no vomiting, no diarrhea, no constipation, no dysphagia, no heartburn, no bloating  Genitourinary: no dysuria, no urinary incontinence, no urinary hesitancy, no urinary frequency, no feelings of urinary urgency, no nocturia  Musculoskeletal: no joint swelling, no joint stiffness, has joint pain, no muscle cramps, no muscle pain  Integument/Breast: no skin rashes, no skin lesions, no itching, no dry skin  Neurological: no numbness, no tingling, no weakness, no confusion, no headaches, no dizziness, no fainting, no tremors, no decrease in memory, no balance problems  Psychiatric: no anxiety, no depression, no insomnia  Hematologic/Lymphatic: no tendency for easy bleeding, no swollen lymph nodes, no tendency for easy bruising  Immunology: no seasonal allergies, no frequent infections, no frequent illnesses  Endocrine: no temperature intolerance    BP (!) 150/78   Pulse 75   Temp 98 °F (36.7 °C)   Resp 14   Ht 5' 10\" (1.778 m)   Wt 186 lb (84.4 kg)   SpO2 98%   BMI 26.69 kg/m²    Wt Readings from Last 3 Encounters:   04/04/22 186 lb (84.4 kg)   02/28/22 177 lb 6 oz (80.5 kg)   11/24/21 175 lb 3.2 oz (79.5 kg)     Body mass index is 26.69 kg/m².     OBJECTIVE:  Constitutional: no acute distress, well appearing and well nourished  Psychiatric: oriented to person, place and time, judgement and insight and normal, recent and remote memory and intact and mood and affect are normal  Skin: skin and subcutaneous tissue is normal without mass, normal turgor  Head and Face: examination of head and face revealed no abnormalities  Eyes: no lid or conjunctival swelling, erythema or discharge, pupils are normal, equal, round, reactive to light  Ears/Nose: external inspection of ears and nose revealed no abnormalities, hearing is grossly normal  Oropharynx/Mouth/Face: lips, tongue and gums are normal with no lesions, the voice quality was normal  Neck: neck is supple and symmetric, with midline trachea and no masses, thyroid is normal  Lymphatics: normal cervical lymph nodes, normal supraclavicular nodes  Pulmonary: no increased work of breathing or signs of respiratory distress, lungs are clear to auscultation  Cardiovascular: normal heart rate and rhythm, normal S1 and S2, no murmurs and pedal pulses and 2+ bilaterally, No edema  Abdomen: abdomen is soft, non-tender with no masses  Musculoskeletal: normal gait and station and exam of the digits and nails are normal, no tenderness over thoracic spine area on palpation  Neurological: normal coordination and normal general cortical function      Lab Review:    Lab Results   Component Value Date    WBC 5.4 02/23/2021    HGB 15.6 11/24/2021    HCT 46.4 11/24/2021    MCV 89.3 02/23/2021     02/23/2021     Lab Results   Component Value Date     01/22/2022    K 4.8 01/22/2022    CL 97 01/22/2022    CO2 27 01/22/2022    BUN 16 01/22/2022    CREATININE 0.9 01/22/2022    GLUCOSE 69 01/22/2022    CALCIUM 9.6 01/22/2022    PROT 7.1 02/23/2021    LABALBU 4.9 11/24/2021    BILITOT 0.5 02/23/2021    ALKPHOS 56 02/23/2021    AST 32 02/23/2021    ALT 34 02/23/2021    LABGLOM >60 01/22/2022    GFRAA >60 01/22/2022    AGRATIO 1.7 02/23/2021    GLOB 2.6 02/23/2021     Lab Results   Component Value Date    TSHFT4 1.45 02/23/2021     Lab Results   Component Value Date    LABA1C 4.8 02/23/2021     Lab Results   Component Value Date    EAG 91.1 02/23/2021     Lab Results   Component Value Date    CHOL 165 02/23/2021     Lab Results   Component Value Date    TRIG 56 02/23/2021     Lab Results   Component Value Date    HDL 66 02/23/2021     Lab Results   Component Value Date    LDLCALC 88 02/23/2021 Lab Results   Component Value Date    LABVLDL 11 02/23/2021     No results found for: [facility]  Lab Results   Component Value Date    LABMICR YES 05/15/2017     No results found for: VITD25     ASSESSMENT/PLAN:  1. Compression fracture of body of thoracic vertebra (HCC)  Provided patient with calcium calculator. Asked patient to calculate his calcium intake in his diet. That is required to better evaluate his calcium needs. Also asked to verify how much calcium is in his multivitamin. Obtain work-up of secondary causes for fractures bone disorders. DEXA scan was normal.  No family history of bone disorders per patient report. - PTH, Intact; Future  - Phosphorus; Future  - Vitamin D 25 Hydroxy; Future  - Electrophoresis Protein, Serum; Future  - Immunofixation serum profile; Future  - Testosterone,  w SHBG Adult Male; Future  - CBC; Future  - Cortisol, Urine, Free; Future  - Creatinine Clearance, Urine, 24 HR; Future  - Calcium, 24 HR Urine; Future  - Sodium, urine, 24 hour; Future  - Comprehensive Metabolic Panel; Future  - Prolactin; Future    2. Bone disorder  Obtain work-up, then reevaluate  - PTH, Intact; Future  - Phosphorus; Future  - Vitamin D 25 Hydroxy; Future  - Electrophoresis Protein, Serum; Future  - Immunofixation serum profile; Future  - Testosterone,  w SHBG Adult Male; Future  - CBC; Future  - Cortisol, Urine, Free; Future  - Creatinine Clearance, Urine, 24 HR; Future  - Calcium, 24 HR Urine; Future  - Sodium, urine, 24 hour; Future  - Comprehensive Metabolic Panel; Future  - Prolactin;  Future      Reviewed and/or ordered clinical lab results Yes  Reviewed and/or ordered radiology tests Yes   Reviewed and/or ordered other diagnostic tests No  Discussed test results with performing physician No  Independently reviewed image, tracing, or specimen No  Made a decision to obtain old records No  Reviewed and summarized old records Yes   Calcium 9.4  Creatinine 1.1  GFR more than 60  25 hydroxy vitamin D 36  PTH 53  Ionized calcium 1.02, low  Calcium 7.4 in 2021  Cortisol 9.6  CPK 60517 in 2021  Obtained history from other than patient No    Sheri Brothers was counseled regarding symptoms of compression fracture, bone disorder presenting with compression fracture diagnosis, course and complications of disease if inadequately treated, diagnosis, treatment options, and prognosis, risks, benefits, complications, and alternatives of treatment, labs, imaging and other studies and treatment targets and goals, work-up. He understands instructions and counseling. Total time I spent for this encounter gathering history, performing physical exam, coordinating care, counseling, and documenting in the chart -45 minutes    Return in about 1 month (around 5/4/2022) for bone problem.     Electronically signed by Azar Khan MD on 4/4/2022 at 11:36 PM

## 2022-05-02 DIAGNOSIS — S22.000A COMPRESSION FRACTURE OF BODY OF THORACIC VERTEBRA (HCC): ICD-10-CM

## 2022-05-02 DIAGNOSIS — M89.9 BONE DISORDER: ICD-10-CM

## 2022-05-02 LAB
24HR URINE VOLUME (ML): 2600 ML
A/G RATIO: 2.4 (ref 1.1–2.2)
ALBUMIN SERPL-MCNC: 5.1 G/DL (ref 3.4–5)
ALP BLD-CCNC: 59 U/L (ref 40–129)
ALT SERPL-CCNC: 26 U/L (ref 10–40)
ANION GAP SERPL CALCULATED.3IONS-SCNC: 17 MMOL/L (ref 3–16)
AST SERPL-CCNC: 63 U/L (ref 15–37)
BILIRUB SERPL-MCNC: 0.5 MG/DL (ref 0–1)
BUN BLDV-MCNC: 13 MG/DL (ref 7–20)
CALCIUM 24 HOUR URINE: 226 MG/24 HR (ref 42–353)
CALCIUM SERPL-MCNC: 10 MG/DL (ref 8.3–10.6)
CHLORIDE BLD-SCNC: 102 MMOL/L (ref 99–110)
CO2: 22 MMOL/L (ref 21–32)
CREAT SERPL-MCNC: 1.1 MG/DL (ref 0.8–1.3)
CREAT SERPL-MCNC: 1.1 MG/DL (ref 0.9–1.3)
CREATININE 24 HOUR URINE: 2 G/24HR (ref 0.6–2.5)
CREATININE CLEARANCE: 107 ML/MIN (ref 100–140)
GFR AFRICAN AMERICAN: >60
GFR NON-AFRICAN AMERICAN: >60
GLUCOSE BLD-MCNC: 75 MG/DL (ref 70–99)
HCT VFR BLD CALC: 43.2 % (ref 40.5–52.5)
HEMOGLOBIN: 14.6 G/DL (ref 13.5–17.5)
Lab: 24 HOURS
Lab: 24 HR
MCH RBC QN AUTO: 30 PG (ref 26–34)
MCHC RBC AUTO-ENTMCNC: 33.9 G/DL (ref 31–36)
MCV RBC AUTO: 88.7 FL (ref 80–100)
PARATHYROID HORMONE INTACT: 24 PG/ML (ref 14–72)
PDW BLD-RTO: 12.3 % (ref 12.4–15.4)
PHOSPHORUS: 4.6 MG/DL (ref 2.5–4.9)
PLATELET # BLD: 146 K/UL (ref 135–450)
PMV BLD AUTO: 10.1 FL (ref 5–10.5)
POTASSIUM SERPL-SCNC: 5 MMOL/L (ref 3.5–5.1)
PROLACTIN: 11 NG/ML
RBC # BLD: 4.88 M/UL (ref 4.2–5.9)
SODIUM 24 HOUR URINE: 73 MMOL/24 HR (ref 40–220)
SODIUM BLD-SCNC: 141 MMOL/L (ref 136–145)
TOTAL PROTEIN: 7.2 G/DL (ref 6.4–8.2)
VITAMIN D 25-HYDROXY: 55.5 NG/ML
WBC # BLD: 3.8 K/UL (ref 4–11)

## 2022-05-03 ENCOUNTER — OFFICE VISIT (OUTPATIENT)
Dept: ENDOCRINOLOGY | Age: 27
End: 2022-05-03
Payer: COMMERCIAL

## 2022-05-03 VITALS
WEIGHT: 171 LBS | HEART RATE: 65 BPM | OXYGEN SATURATION: 99 % | BODY MASS INDEX: 24.48 KG/M2 | TEMPERATURE: 98 F | RESPIRATION RATE: 14 BRPM | HEIGHT: 70 IN

## 2022-05-03 DIAGNOSIS — S22.000A COMPRESSION FRACTURE OF BODY OF THORACIC VERTEBRA (HCC): Primary | ICD-10-CM

## 2022-05-03 DIAGNOSIS — R29.890 DECREASED BODY HEIGHT: ICD-10-CM

## 2022-05-03 DIAGNOSIS — R79.89 ELEVATED LIVER FUNCTION TESTS: ICD-10-CM

## 2022-05-03 DIAGNOSIS — M89.9 BONE DISORDER: ICD-10-CM

## 2022-05-03 PROCEDURE — 99215 OFFICE O/P EST HI 40 MIN: CPT | Performed by: INTERNAL MEDICINE

## 2022-05-03 RX ORDER — ALENDRONATE SODIUM 70 MG/1
70 TABLET ORAL
Qty: 4 TABLET | Refills: 3 | Status: SHIPPED | OUTPATIENT
Start: 2022-05-03

## 2022-05-03 NOTE — PROGRESS NOTES
SUBJECTIVE:  Mohsen Bah is a 32 y.o. male who is being evaluated for compression fracture of thoracic vertebra for endocrinology related bone disorder. 1. Compression fracture of body of thoracic vertebra (HCC)  This started in 9/2021. Patient was diagnosed with thoracic vertebra compression fracture. The problem has been unchanged. Patient started medication in N/A. Currently patient is on: N/A. Misses N/A doses a month. Current complaints: back pain, shoulder pain  Has joint pains, knee, elbow, wrist pains, mostly activity related. Back pain and shoulder pain are related to previous trauma. On 8/16/2021 patient had 7 miles running race and the developed severe rhabdomyolysis and hyponatremia. At that time he had seizure, memory loss. Patient was unconscious. Patient had restraints for all night and was trying to get out. He developed a dislocation of both shoulders, fractures of both shoulders. His back pain started at that time, most likely presenting compression fracture. He had several surgeries and bone grafts. He was hospitalized for 1 month. Since then patient is 2 inches shorter. He had pain after surgery. He is currently doing physical therapy at home. Patient states that PT does not improve his pain significantly. He only had very slightly improvement in 6 months. His pain is 8 out of 10. It is located in the back in the thoracic area. It is radiating to the chest.  He does not take medications for pain. The pain is worse with the cough, laying down, sitting or standing. Patient saw spinal surgeon for consultation. The recommendation was to do physical therapy. Patient denies family history of bone disorders or osteoporosis. Patient has been always active with the sports and activities. He usually follows keto diet in January for 2 weeks. No RA, using steroids long term, smoking history. No spine or hip fractures in mother or father.     History of obstructive symptoms: difficulty swallowing No, changes in voice/hoarseness No.  History of radiation to patient's neck: No  Resent iodine exposure: No  Family history includes no thyroid abnormalities. Family history of thyroid cancer: No    Patient to cut testosterone booster prior to the injury, but not afterwards. He is not sure what was in the supplement. He felt better on it. Patient denies problems with libido or erectile function. He takes vitamin D 2000 international units 1-2 times daily. Takes calcium  In MVI    2. Bone disorder  Patient was referred for endocrinology consultation regarding compression fracture to evaluate for possible bone disorder. He had injuries prior to thoracic vertebral fracture per patient report. Mostly related to activities and sports. I reviewed all available laboratory and imaging data. There was no record of reported thoracic spine compression fracture prior to trauma what he experienced when restrained. 3.  Elevated liver function test  No nausea, vomiting, abdominal pain    4. Decreased body height  Patient is 2 inches shorter compared with the prior trauma height        EXAMINATION:   CT OF THE THORACIC SPINE WITHOUT CONTRAST  1/24/2022 7:41 am:       TECHNIQUE:   CT of the thoracic spine was performed without the administration of   intravenous contrast. Multiplanar reformatted images are provided for review.    Dose modulation, iterative reconstruction, and/or weight based adjustment of   the mA/kV was utilized to reduce the radiation dose to as low as reasonably   achievable.       COMPARISON:   None.       HISTORY:   ORDERING SYSTEM PROVIDED HISTORY: Chronic midline thoracic back pain   TECHNOLOGIST PROVIDED HISTORY:   Reason for Exam: loss of 2 inches in height in 6 month time span   Relevant Medical/Surgical History: no hx of surg to back       FINDINGS:   BONES/ALIGNMENT: There is mild wedge compression fracture deformity involving   the superior endplates of T5, T6 and T7.  There is approximately 15% loss of   height at T5, 50% loss of height at T6 and less than 10% loss of height at   T7.  There is no retropulsion.  Mild disc space narrowing and endplate   osteophyte formation is present at these levels.       DEGENERATIVE CHANGES: No gross spinal canal stenosis or bony neural foraminal   narrowing of the thoracic spine.       SOFT TISSUES: No paraspinal mass is seen.           Impression   Mild chronic midthoracic compression fractures/spondylosis. EXAMINATION:   BONE DENSITOMETRY       1/31/2022 1:51 pm       TECHNIQUE:   A bone density DEXA scan was performed of the lumbar spine and left hip on a   HoloYobble system.       COMPARISON:   None.       HISTORY:   ORDERING SYSTEM PROVIDED HISTORY: Compression fracture of body of thoracic   vertebra [facility])       23-year-old male with compression fracture of body of thoracic vertebra       FINDINGS:   LUMBAR SPINE:       The bone mineral density in the lumbar spine including the L1-L4 levels is   measured at 1.048 g/cm2, which corresponds to a T-score of -0.4 and a Z-score   of -0.4.  This is within the normal range by WHO criteria.       LEFT HIP:       The bone mineral density in the total hip is measured at 1.115 g/cm2   corresponding to a T-score of 0.5 and a Z-score of 0.6.  This is within the   normal range by [facility] criteria.       The bone mineral density of the femoral neck is measured at 0.944 g/cm2   corresponding to a T-score of 0.1 and a Z-score of 0.2.  This is within the   normal range by [facility] criteria.           Impression   Normal bone mineral density by WHO criteria.  Fracture risk is low.       RECOMMENDATIONS:   Unavailable         1. Small corner fracture or remote bony defect along the upper margin lunate near the triquetrum without active edema or acute fracture in the wrist.       2. No joint effusion or capsular injury identified. The TFCC remains intact.  Flexor and extensor tendons appear normal. No carpal ganglion on or midcarpal instability appreciated.          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    Rhabdomyolysis 08/25/2021    Seizure ([facility]      Patient Active Problem List    Diagnosis Date Noted    Elevated liver function tests 05/03/2022    Decreased body height 05/03/2022    Compression fracture of body of thoracic vertebra ([facility] 04/04/2022    Bone disorder 04/04/2022    Wedge compression fracture of unspecified thoracic vertebra, initial encounter for open fracture ([facility] 02/10/2022    ADHD, predominantly inattentive type 05/17/2006     Past Surgical History:   Procedure Laterality Date    SHOULDER SURGERY Bilateral 09/2021     Family History   Problem Relation Age of Onset    No Known Problems Mother     No Known Problems Father     No Known Problems Brother     No Known Problems Maternal Grandmother     No Known Problems Maternal Grandfather     No Known Problems Paternal Grandmother     No Known Problems Paternal Grandfather     No Known Problems Brother      Social History     Socioeconomic History    Marital status: Single     Spouse name: None    Number of children: None    Years of education: None    Highest education level: None   Occupational History    Occupation:     Tobacco Use    Smoking status: Never Smoker    Smokeless tobacco: Never Used   Vaping Use    Vaping Use: Never used   Substance and Sexual Activity    Alcohol use: Not Currently     Comment: couple times a week    Drug use: No    Sexual activity: Yes     Partners: Female   Other Topics Concern    None   Social History Narrative    None     Social Determinants of Health     Financial Resource Strain: Low Risk     Difficulty of Paying Living Expenses: Not hard at all   Food Insecurity: No Food Insecurity    Worried About Running Out of Food in the Last Year: Never true  Ran Out of Food in the Last Year: Never true   Transportation Needs:     Lack of Transportation (Medical): Not on file    Lack of Transportation (Non-Medical): Not on file   Physical Activity:     Days of Exercise per Week: Not on file    Minutes of Exercise per Session: Not on file   Stress:     Feeling of Stress : Not on file   Social Connections:     Frequency of Communication with Friends and Family: Not on file    Frequency of Social Gatherings with Friends and Family: Not on file    Attends Mu-ism Services: Not on file    Active Member of [address] [place] [address] or Organizations: Not on file    Attends Club or Organization Meetings: Not on file    Marital Status: Not on file   Intimate Partner Violence:     Fear of Current or Ex-Partner: Not on file    Emotionally Abused: Not on file    Physically Abused: Not on file    Sexually Abused: Not on file   Housing Stability:     Unable to Pay for Housing in the Last Year: Not on file    Number of [place] in the Last Year: Not on file    Unstable Housing in the Last Year: Not on file     Current Outpatient Medications   Medication Sig Dispense Refill    MISC NATURAL PRODUCTS PO Take by mouth Prime T - testosterone supplement.  Nutritional Supplements (CREATINE) 750 MG CAPS Take by mouth [place] Brand Supplement      alendronate (FOSAMAX) 70 MG tablet Take 1 tablet by mouth every 7 days Take once per week in the morning with a full glass of water, on an empty stomach, and do not take anything else by mouth or lie down for the next 30 minutes.  4 tablet 3    Multiple Vitamins-Minerals (VITAMINS TO GO MEN PO) Take by mouth      vitamin C (ASCORBIC ACID) 500 MG tablet Take 500 mg by mouth daily      Cholecalciferol (VITAMIN D) 50 MCG (2000 UT) CAPS capsule Take by mouth      turmeric 500 MG CAPS Take 1 capsule by mouth daily      Potassium (POTASSIMIN PO) Take by mouth      Omega-3 Fatty Acids (FISH OIL) 1000 MG CAPS Take 3,000 mg by mouth daily No current facility-administered medications for this visit.      No Known Allergies  Family Status   Relation Name Status    Mother  Alive    Father  Alive    Brother  Alive    MGM      MGF      PGM      PGF  Alive    Brother  Alive       Review of Systems:  Constitutional: has fatigue, no fever, no recent weight gain, no recent weight loss, no changes in appetite  Eyes: no eye pain, no change in vision, no eye redness, no eye irritation, no double vision  Ears, nose, throat: no nasal congestion, no sore throat, no earache, no decrease in hearing, no hoarseness, no dry mouth, no sinus problems, no difficulty swallowing, no neck lumps, no dental problems, no mouth sores, has ringing in ears  Pulmonary: no shortness of breath, no wheezing, no dyspnea on exertion, no cough  Cardiovascular: no chest pain, no lower extremity edema, no orthopnea, no intermittent leg claudication, no palpitations  Gastrointestinal: no abdominal pain, no nausea, no vomiting, no diarrhea, no constipation, no dysphagia, no heartburn, no bloating  Genitourinary: no dysuria, no urinary incontinence, no urinary hesitancy, no urinary frequency, no feelings of urinary urgency, no nocturia  Musculoskeletal: no joint swelling, no joint stiffness, has joint pain, no muscle cramps, no muscle pain  Integument/Breast: no skin rashes, no skin lesions, no itching, no dry skin  Neurological: no numbness, no tingling, no weakness, no confusion, no headaches, no dizziness, no fainting, no tremors, no decrease in memory, no balance problems  Psychiatric: no anxiety, no depression, no insomnia  Hematologic/Lymphatic: no tendency for easy bleeding, no swollen lymph nodes, no tendency for easy bruising  Immunology: no seasonal allergies, no frequent infections, no frequent illnesses  Endocrine: no temperature intolerance    Pulse 65   Temp 98 °F (36.7 °C)   Resp 14   Ht 5' 10\" (1.778 m)   Wt 171 lb (77.6 kg)   SpO2 99% BMI 24.54 kg/m²    Wt Readings from Last 3 Encounters:   05/03/22 171 lb (77.6 kg)   04/04/22 186 lb (84.4 kg)   02/28/22 177 lb 6 oz (80.5 kg)     Body mass index is 24.54 kg/m².     OBJECTIVE:  Constitutional: no acute distress, well appearing and well nourished  Psychiatric: oriented to person, place and time, judgement and insight and normal, recent and remote memory and intact and mood and affect are normal  Skin: skin and subcutaneous tissue is normal without mass, normal turgor  Head and Face: examination of head and face revealed no abnormalities  Eyes: no lid or conjunctival swelling, erythema or discharge, pupils are normal, equal, round, reactive to light  Ears/Nose: external inspection of ears and nose revealed no abnormalities, hearing is grossly normal  Oropharynx/Mouth/Face: lips, tongue and gums are normal with no lesions, the voice quality was normal  Neck: neck is supple and symmetric, with midline trachea and no masses, thyroid is normal  Lymphatics: normal cervical lymph nodes, normal supraclavicular nodes  Pulmonary: no increased work of breathing or signs of respiratory distress, lungs are clear to auscultation  Cardiovascular: normal heart rate and rhythm, normal S1 and S2, no murmurs and pedal pulses and 2+ bilaterally, No edema  Abdomen: abdomen is soft, non-tender with no masses  Musculoskeletal: normal gait and station and exam of the digits and nails are normal, no tenderness over thoracic spine area on palpation  Neurological: normal coordination and normal general cortical function      Lab Review:    Lab Results   Component Value Date    WBC 3.8 05/02/2022    HGB 14.6 05/02/2022    HCT 43.2 05/02/2022    MCV 88.7 05/02/2022     05/02/2022     Lab Results   Component Value Date     05/02/2022    K 5.0 05/02/2022     05/02/2022    CO2 22 05/02/2022    BUN 13 05/02/2022    CREATININE 1.1 05/02/2022    GLUCOSE 75 05/02/2022    CALCIUM 10.0 05/02/2022    PROT 7.2 05/02/2022    LABALBU 5.1 05/02/2022    BILITOT 0.5 05/02/2022    ALKPHOS 59 05/02/2022    AST 63 05/02/2022    ALT 26 05/02/2022    LABGLOM >60 05/02/2022    GFRAA >60 05/02/2022    AGRATIO 2.4 05/02/2022    GLOB 2.6 02/23/2021     Lab Results   Component Value Date    TSHFT4 1.45 02/23/2021     Lab Results   Component Value Date    LABA1C 4.8 02/23/2021     Lab Results   Component Value Date    EAG 91.1 02/23/2021     Lab Results   Component Value Date    CHOL 165 02/23/2021     Lab Results   Component Value Date    TRIG 56 02/23/2021     Lab Results   Component Value Date    HDL 66 02/23/2021     Lab Results   Component Value Date    LDLCALC 88 02/23/2021     Lab Results   Component Value Date    LABVLDL 11 02/23/2021     No results found for: [facility]  Lab Results   Component Value Date    LABMICR YES 05/15/2017     Lab Results   Component Value Date    VITD25 55.5 05/02/2022        ASSESSMENT/PLAN:  1. Compression fracture of body of thoracic vertebra (HCC)  Start alendronate 70 mg weekly  Counseled about side effects  Patient stopped testosterone before, he states testosterone was normal  Provided patient with calcium calculator. Asked patient to calculate his calcium intake in his diet. That is required to better evaluate his calcium needs. Also asked to verify how much calcium is in his multivitamin. CT thoracic spine 1/24/2022  BONES/ALIGNMENT: There is mild wedge compression fracture deformity involving   the superior endplates of T5, T6 and T7.  There is approximately 15% loss of   height at T5, 50% loss of height at T6 and less than 10% loss of height at   T7.     Chest x-ray dated 11/24/2021 does not describe spine bone abnormality    DEXA scan was normal.  No family history of bone disorders per patient report.   Creatinine clearance 107  24-hour urine cortisol pending  Testosterone pending  Protein electrophoresis pending  Patient is taking testosterone supplement from internet, he understands the risks  24-hour urine 226, sodium 73  Hemoglobin 14.6  Hematocrit 43.2  25 hydroxy vitamin D 55.5  Prolactin 11  PTH 24.0  Phosphorus 4.6  Glucose 7.5  Calcium 10.0, upper limit normal 10.6  ALT 26  AST 63  - PTH, Intact; Future  - Phosphorus; Future  - Vitamin D 25 Hydroxy; Future  - Testosterone,  w SHBG Adult Male; Future  - CBC; Future  - Comprehensive Metabolic Panel; Future    2. Bone disorder    - PTH, Intact; Future  - Phosphorus; Future  - Vitamin D 25 Hydroxy; Future  - Testosterone,  w SHBG Adult Male; Future  - CBC; Future  - Comprehensive Metabolic Panel; Future    3. Elevated liver enzymes  Patient was informed about testosterone preparation  ALT 26  AST 63  - CMP    4. Decreased in body height  Patient is 2 inches shorter compared with his height prior to trauma and compression fracture        Reviewed and/or ordered clinical lab results Yes  Reviewed and/or ordered radiology tests Yes   Reviewed and/or ordered other diagnostic tests No  Discussed test results with performing physician No  Independently reviewed image, tracing, or specimen No  Made a decision to obtain old records No  Reviewed and summarized old records Yes   Calcium 9.4  Creatinine 1.1  GFR more than 60  25 hydroxy vitamin D 36  PTH 53  Ionized calcium 1.02, low  Calcium 7.4 in 2021  Cortisol 9.6  CPK 86996 in 2021  Obtained history from other than patient Tamica Fallon was counseled regarding symptoms of compression fracture, bone disorder presenting with compression fracture diagnosis, course and complications of disease if inadequately treated, diagnosis, treatment options, and prognosis, risks, benefits, complications, and alternatives of treatment, labs, imaging and other studies and treatment targets and goals, work-up. He understands instructions and counseling.     Total time I spent for this encounter gathering history, performing physical exam, coordinating care, counseling, and documenting in the chart -40 minutes    Return in about 3 months (around 8/3/2022) for compression fracture.     Electronically signed by Sara Espinosa MD on 5/3/2022 at 11:42 AM

## 2022-05-04 LAB
ALBUMIN SERPL-MCNC: 4.1 G/DL (ref 3.1–4.9)
ALPHA-1-GLOBULIN: 0.3 G/DL (ref 0.2–0.4)
ALPHA-2-GLOBULIN: 0.5 G/DL (ref 0.4–1.1)
BETA GLOBULIN: 0.9 G/DL (ref 0.9–1.6)
GAMMA GLOBULIN: 1.4 G/DL (ref 0.6–1.8)
SEX HORMONE BINDING GLOBULIN: 129 NMOL/L (ref 17–56)
SPE/IFE INTERPRETATION: NORMAL
TESTOSTERONE FREE PERCENT: 0.8 % (ref 1.6–2.9)
TESTOSTERONE FREE, CALC: 108 PG/ML (ref 47–244)
TESTOSTERONE TOTAL-MALE: 1341 NG/DL (ref 300–1080)
TESTOSTERONE, BIOAVAILABLE: 327 NG/DL (ref 131–682)

## 2022-05-05 LAB
CORTISOL (UR), FREE: 16.3 UG/D
CORTISOL URINE, FREE (/G CRT): 8.35 UG/G CRT
CORTISOL,F,UG/L,U: 6.26 UG/L
CREATININE 24 HOUR URINE: 1950 MG/D (ref 1000–2500)
CREATININE URINE: 75 MG/DL
HOURS COLLECTED: 24
INTERPRETATION: NORMAL
URINE TOTAL VOLUME: 2600

## 2022-05-08 ENCOUNTER — TELEPHONE (OUTPATIENT)
Dept: ENDOCRINOLOGY | Age: 27
End: 2022-05-08

## 2022-11-28 ENCOUNTER — TELEPHONE (OUTPATIENT)
Dept: FAMILY MEDICINE CLINIC | Age: 27
End: 2022-11-28

## 2022-11-28 NOTE — TELEPHONE ENCOUNTER
----- Message from Valentina Moss sent at 11/28/2022 12:06 PM EST -----  Subject: Appointment Request    Reason for Call: Established Patient Appointment needed: Routine Physical   Exam    QUESTIONS    Reason for appointment request? No appointments available during search     Additional Information for Provider? i was told to send a message even   when its a urgent time frame pt is needing to be seen today or possibly at   the end of the month he works from 9 to 5 but can leave during the day he   is already off work for the moment   ---------------------------------------------------------------------------  --------------  [facility]  [phone]; OK to leave message on voicemail  ---------------------------------------------------------------------------  --------------  SCRIPT ANSWERS  COVID Screen: Cindy Jordan

## 2022-11-29 ENCOUNTER — OFFICE VISIT (OUTPATIENT)
Dept: FAMILY MEDICINE CLINIC | Age: 27
End: 2022-11-29
Payer: COMMERCIAL

## 2022-11-29 VITALS
SYSTOLIC BLOOD PRESSURE: 118 MMHG | RESPIRATION RATE: 16 BRPM | BODY MASS INDEX: 27.52 KG/M2 | TEMPERATURE: 96.8 F | HEART RATE: 68 BPM | OXYGEN SATURATION: 97 % | HEIGHT: 69 IN | WEIGHT: 185.8 LBS | DIASTOLIC BLOOD PRESSURE: 74 MMHG

## 2022-11-29 DIAGNOSIS — F90.0 ADHD, PREDOMINANTLY INATTENTIVE TYPE: Primary | ICD-10-CM

## 2022-11-29 DIAGNOSIS — Z00.00 WELL ADULT EXAM: ICD-10-CM

## 2022-11-29 PROCEDURE — 99385 PREV VISIT NEW AGE 18-39: CPT | Performed by: NURSE PRACTITIONER

## 2022-11-29 RX ORDER — DEXMETHYLPHENIDATE HYDROCHLORIDE 20 MG/1
20 CAPSULE, EXTENDED RELEASE ORAL DAILY
Qty: 30 CAPSULE | Refills: 0 | Status: SHIPPED | OUTPATIENT
Start: 2022-11-29 | End: 2022-12-29

## 2022-11-29 RX ORDER — DEXMETHYLPHENIDATE HYDROCHLORIDE 5 MG/1
5 TABLET ORAL DAILY PRN
Qty: 30 TABLET | Refills: 0 | Status: SHIPPED | OUTPATIENT
Start: 2022-11-29 | End: 2022-12-29

## 2022-11-29 ASSESSMENT — ENCOUNTER SYMPTOMS
WHEEZING: 0
SHORTNESS OF BREATH: 0
CHEST TIGHTNESS: 0

## 2022-11-29 NOTE — TELEPHONE ENCOUNTER
Called pt and was told that he already had an appt this morning and that he doesn't need anything else at this time

## 2022-11-29 NOTE — PATIENT INSTRUCTIONS
Restart Focalin extended release daily  May take additional 5mg fast acting focalin as needed mid-day  Follow up with specialist- refer to list

## 2022-11-29 NOTE — PROGRESS NOTES
Sim Hawley (:  1995) is a 32 y.o. male,{New vs Established:[id number]::\"Established patient\"}, here for evaluation of the following chief complaint(s): Annual Exam (Needing letter sent to insurance company FAX: [phone] ATTN: [id number] [facility] )      ASSESSMENT/PLAN:  {There are no diagnoses linked to this encounter. (Refresh or delete this SmartLink)}    There are no Patient Instructions on file for this visit. No follow-ups on file. SUBJECTIVE/OBJECTIVE:  Chas Butts is here for evaluation of attention deficit and hyperactivity disorder. Current symptoms:  Poor attention to details: {yes/no:012094}  Difficulty sustaining attention: {yes/no:040985}  Listens poorly: {yes/no:853495}  Does not finish tasks: {yes/no:262440}  Difficulty organizing tasks: {yes/no:206079}  Avoids tasks requiring substantial mental effort: {yes/no:498246}  Loses things: {yes/no:299140}  Easily distracted: yes  Forgetful: yes  Fidgets: no  Cannot remain seated: {yes/no:792084}  Runs or climbs inappropriately: {yes/no:310394}  Difficulty doing quiet activities: {yes/no:094017}  Acts as if driven by a motor: {KHUSHBOO/[place]:732421}  Talks excessively: {yes/no:850533}  Blurts out answers: {yes/no:570035}  Cannot wait his or her turn: {yes/no:792785}  Interrupts: {yes/no:016569}  Impairment in two or more settings: {yes/no:931151}             ADHD  This is a recurrent problem. The current episode started more than 1 year ago. The problem occurs daily. The problem has been gradually worsening. Current Outpatient Medications   Medication Sig Dispense Refill    MISC NATURAL PRODUCTS PO Take by mouth Prime T - testosterone supplement.       Nutritional Supplements (CREATINE) 750 MG CAPS Take by mouth [place] Brand Supplement      alendronate (FOSAMAX) 70 MG tablet Take 1 tablet by mouth every 7 days Take once per week in the morning with a full glass of water, on an empty stomach, and do not take anything else by mouth or lie down for the next 30 minutes. 4 tablet 3    Multiple Vitamins-Minerals (VITAMINS TO GO MEN PO) Take by mouth      vitamin C (ASCORBIC ACID) 500 MG tablet Take 500 mg by mouth daily      Cholecalciferol (VITAMIN D) 50 MCG (2000 UT) CAPS capsule Take by mouth      turmeric 500 MG CAPS Take 1 capsule by mouth daily      Potassium (POTASSIMIN PO) Take by mouth      Omega-3 Fatty Acids (FISH OIL) 1000 MG CAPS Take 3,000 mg by mouth daily        No current facility-administered medications for this visit.        Past Medical History:  2002: ADHD  08/16/2021: Hyponatremia  09/01/2021: Pathological dislocation of shoulder joint, bilateral  08/25/2021: Rhabdomyolysis  No date: Seizure [facility])  Past Surgical History:   Procedure Laterality Date    SHOULDER SURGERY Bilateral 09/2021     Social History     Socioeconomic History    Marital status: Single     Spouse name: Not on file    Number of children: Not on file    Years of education: Not on file    Highest education level: Not on file   Occupational History    Occupation:     Tobacco Use    Smoking status: Never    Smokeless tobacco: Never   Vaping Use    Vaping Use: Never used   Substance and Sexual Activity    Alcohol use: Not Currently     Comment: couple times a week    Drug use: No    Sexual activity: Yes     Partners: Female   Other Topics Concern    Not on file   Social History Narrative    Not on file     Social Determinants of Health     Financial Resource Strain: Low Risk     Difficulty of Paying Living Expenses: Not hard at all   Food Insecurity: No Food Insecurity    Worried About Running Out of Food in the Last Year: Never true    Ran Out of Food in the Last Year: Never true   Transportation Needs: Not on file   Physical Activity: Not on file   Stress: Not on file   Social Connections: Not on file   Intimate Partner Violence: Not on file   Housing Stability: Not on file         Review of Systems  Vitals:    11/29/22 0832   BP: 118/74   Pulse: 68 Resp: 16   Temp: 96.8 °F (36 °C)   TempSrc: Temporal   SpO2: 97%   Weight: 185 lb 12.8 oz (84.3 kg)   Height: 5' 9\" (1.753 m)      No results found for this or any previous visit (from the past 2016 hour(s)). Wt Readings from Last 3 Encounters:   11/29/22 185 lb 12.8 oz (84.3 kg)   05/03/22 171 lb (77.6 kg)   04/04/22 186 lb (84.4 kg)        Physical Exam      {Time Documentation Optional:226545084}        An electronic signature was used to authenticate this note.     --Tigre Fernandez, TOM - CNP

## 2022-11-29 NOTE — LETTER
[address], [address] [address]  Phone: [phone]  Fax: [phone]    TOM Kee CNP        November 29, 2022     Patient: Melissa Sanchez   YOB: 1995   Date of Visit: 11/29/2022       To Whom It May Concern: It is my medical opinion that Maxine Patel was seen today for well adult preventative exam.    If you have any questions or concerns, please don't hesitate to call.     Sincerely,        TOM Kee CNP

## 2022-11-29 NOTE — PROGRESS NOTES
2022    Sim Hawley (:  1995) is a 32 y.o. male, here for a preventive medicine evaluation. Patient Active Problem List   Diagnosis    ADHD, predominantly inattentive type    Wedge compression fracture of unspecified thoracic vertebra, initial encounter for open fracture ([facility] [address]    Compression fracture of body of thoracic vertebra (HCC)    Bone disorder    Elevated liver function tests    Decreased body height       Review of Systems   Constitutional:  Negative for activity change, appetite change and unexpected weight change. Respiratory:  Negative for chest tightness, shortness of breath and wheezing. Cardiovascular:  Negative for chest pain and palpitations. Skin:  Negative for pallor and rash. Neurological:  Negative for dizziness, numbness and headaches. Psychiatric/Behavioral:  Positive for decreased concentration and sleep disturbance. Negative for behavioral problems. The patient is not nervous/anxious. Prior to Visit Medications    Medication Sig Taking? Authorizing Provider   MISC NATURAL PRODUCTS PO Take by mouth Prime T - testosterone supplement. Yes Historical Provider, MD   Nutritional Supplements (CREATINE) 750 MG CAPS Take by mouth [place] Brand Supplement Yes Historical Provider, MD   alendronate (FOSAMAX) 70 MG tablet Take 1 tablet by mouth every 7 days Take once per week in the morning with a full glass of water, on an empty stomach, and do not take anything else by mouth or lie down for the next 30 minutes.  Yes Scar Langston MD   Multiple Vitamins-Minerals (VITAMINS TO GO MEN PO) Take by mouth Yes Historical Provider, MD   vitamin C (ASCORBIC ACID) 500 MG tablet Take 500 mg by mouth daily Yes Historical Provider, MD   Cholecalciferol (VITAMIN D) 50 MCG ( UT) CAPS capsule Take by mouth Yes Historical Provider, MD   turmeric 500 MG CAPS Take 1 capsule by mouth daily Yes Historical Provider, MD   Potassium (POTASSIMIN PO) Take by mouth Yes Historical Provider, MD   Omega-3 Fatty Acids (FISH OIL) 1000 MG CAPS Take 3,000 mg by mouth daily  Yes Historical Provider, MD        No Known Allergies    Past Medical History:   Diagnosis Date    ADHD 2002    Hyponatremia 08/16/2021    Pathological dislocation of shoulder joint, 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    Rhabdomyolysis 08/25/2021    Seizure ([facility] [address]        Past Surgical History:   Procedure Laterality Date    SHOULDER SURGERY Bilateral 09/2021       Social History     Socioeconomic History    Marital status: Single     Spouse name: Not on file    Number of children: Not on file    Years of education: Not on file    Highest education level: Not on file   Occupational History    Occupation:     Tobacco Use    Smoking status: Never    Smokeless tobacco: Never   Vaping Use    Vaping Use: Never used   Substance and Sexual Activity    Alcohol use: Not Currently     Comment: couple times a week    Drug use: No    Sexual activity: Yes     Partners: Female   Other Topics Concern    Not on file   Social History Narrative    Not on file     Social Determinants of Health     Financial Resource Strain: Low Risk     Difficulty of Paying Living Expenses: Not hard at all   Food Insecurity: No Food Insecurity    Worried About Running Out of Food in the Last Year: Never true    Ran Out of Food in the Last Year: Never true   Transportation Needs: Not on file   Physical Activity: Not on file   Stress: Not on file   Social Connections: Not on file   Intimate Partner Violence: Not on file   Housing Stability: Not on file        Family History   Problem Relation Age of Onset    No Known Problems Mother     No Known Problems Father     No Known Problems Brother     No Known Problems Maternal Grandmother     No Known Problems Maternal Grandfather     No Known Problems Paternal Grandmother     No Known Problems Paternal Grandfather     No Known Problems Brother          Vitals:    11/29/22 0832   BP: 118/74   Pulse: 68   Resp: 16   Temp: 96.8 °F (36 °C)   TempSrc: Temporal   SpO2: 97%   Weight: 185 lb 12.8 oz (84.3 kg)   Height: 5' 9\" (1.753 m)     Estimated body mass index is 27.44 kg/m² as calculated from the following:    Height as of this encounter: 5' 9\" (1.753 m). Weight as of this encounter: 185 lb 12.8 oz (84.3 kg). Physical Exam  Vitals and nursing note reviewed. Constitutional:       General: He is not in acute distress. Appearance: Normal appearance. He is not ill-appearing. HENT:      Head: Normocephalic and atraumatic. Nose: Nose normal.   Eyes:      Extraocular Movements: Extraocular movements intact. Conjunctiva/sclera: Conjunctivae normal.      Pupils: Pupils are equal, round, and reactive to light. Neck:      Vascular: No carotid bruit. Cardiovascular:      Rate and Rhythm: Normal rate and regular rhythm. Pulses: Normal pulses. Heart sounds: Normal heart sounds. Pulmonary:      Effort: Pulmonary effort is normal.      Breath sounds: Normal breath sounds. Musculoskeletal:         General: No swelling. Normal range of motion. Cervical back: Normal range of motion and neck supple. Right lower leg: No edema. Left lower leg: No edema. Skin:     General: Skin is warm and dry. Capillary Refill: Capillary refill takes less than 2 seconds. Coloration: Skin is not jaundiced or pale. Neurological:      General: No focal deficit present. Mental Status: He is alert and oriented to person, place, and time. Motor: No weakness. Gait: Gait normal.   Psychiatric:         Mood and Affect: Mood normal.         Behavior: Behavior normal.         Thought Content: Thought content normal.         Judgment: Judgment normal.       No flowsheet data found.     Lab Results   Component Value Date/Time    CHOL 165 02/23/2021 12:48 PM    TRIG 56 02/23/2021 12:48 PM    HDL 66 02/23/2021 12:48 PM    LDLCALC 88 02/23/2021 12:48 PM    GLUCOSE 75 05/02/2022 09:51 AM    LABA1C 4.8 02/23/2021 12:48 PM         Immunization History   Administered Date(s) Administered    COVID-19, MODERNA BLUE border, Primary or Immunocompromised, (age 12y+), IM, 100 mcg/0.5mL 04/01/2021, 04/29/2021, 12/10/2021    DTaP 1993, 1993, 1993, 08/05/1994, 08/04/1996, 02/02/1998    DTaP (Infanrix) 1993, 1993, 1993, 08/05/1994, 02/02/1998    HPV Quadrivalent (Gardasil) 05/09/2013, 08/02/2013, 08/12/2014    Hepatitis B Ped/Adol (Engerix-B, Recombivax HB) 1995, 1993, 1993    Hib PRP-OMP (PedvaxHIB) 1993, 1993, 1993, 05/06/1994    Hib vaccine 1993, 1993, 1993, 05/06/1994    MMR 05/06/1994, 02/02/1998    Meningococcal MCV4P (Menactra) 04/25/2007, 05/24/2011    Polio IPV (IPOL) 1993, 1993, 08/05/1994, 02/02/1998    Polio OPV 1993, 1993, 08/05/1994, 02/02/1998    Td (Adult), 2 Lf Tetanus Toxoid, Pf (Td, Absorbed) 02/09/2005    Td (Adult), 5 Lf Tetanus Toxoid, Pf (Tenivac, Decavac) 02/09/2005    Tdap (Boostrix, Adacel) 03/04/2009, 02/28/2022    Varicella (Varivax) 06/01/1997       Health Maintenance   Topic Date Due    Flu vaccine (1) 11/29/2023 (Originally 8/1/2022)    COVID-19 Vaccine (4 - Booster for Moderna series) 11/29/2023 (Originally 2/4/2022)    Depression Screen  02/28/2023    DTaP/Tdap/Td vaccine (8 - Td or Tdap) 02/28/2032    Hib vaccine  Completed    Meningococcal (ACWY) vaccine  Completed    Hepatitis A vaccine  Aged Out    Pneumococcal 0-64 years Vaccine  Aged Out    Varicella vaccine  Discontinued    Hepatitis C screen  Discontinued    HIV screen  Discontinued       Assessment & Plan   Well adult exam  - Healthy exam  - Bloodwork and vaccines up to date         --Paty Preston, APRN - CNP

## 2022-11-29 NOTE — PROGRESS NOTES
Sim Hawley (: 1995) is a 32 y.o. male is here for evaluation of the following chief complaint(s): Annual Exam (Needing letter sent to insurance company FAX: [phone] ATTN: [id number] [facility] )    Assessment/Plan:   1. ADHD, predominantly inattentive type  -     Dexmethylphenidate HCl ER (FOCALIN XR) 20 MG CP24; Take 1 capsule by mouth daily for 30 days. , Disp-30 capsule, R-0Normal  -     dexmethylphenidate (FOCALIN) 5 MG tablet; Take 1 tablet by mouth daily as needed (concentration) for up to 30 days. , Disp-30 tablet, R-0Normal  - Previously tolerated medication started today        - Provided specialist list for re-eval and management    No follow-ups on file. Subjective/Objective:   His concerns today are recurrent. Symptoms have been present for several months. He reports these issues have had the following impacting on their life:  Difficulties at work . Family history of ADHD: No.    Symptoms of inattention: fails to give close attention to details or makes careless mistakes in school, work, or other activities, has difficulty sustaining attention in tasks or play activities, does not seem to listen when spoken to directly, has difficulty organizing tasks and activities, loses things that are necessary for tasks and activities, is easily distracted by extraneous stimuli, and is often forgetful in daily activities. Symptoms of hyperactivity include:  none . Symptoms of impulsivity:  none . Previous ADHD testing has been completed: yes. Current medications include: no treatment since college. He has tried the following medications in the past: Pyschostimulants: Focalin- unknown dose and Focalin XR- unknown dose . Side effects from medication include: none. Personal neuropsychiatric history is positive for ADHD  . Family history of neuropsychiatric history is negative.  has been reviewed. A comprehensive review of systems was negative.       Exam:  General: alert, appears stated age, and cooperative  Heart exam: normal rate, regular rhythm, normal S1, S2, no murmurs, rubs, clicks or gallops  Lung exam: clear to auscultation bilaterally  Neuro: no gross deficits  Mental Status exam: normal mood, behavior, and thought processes, able to follow commands  /74   Pulse 68   Temp 96.8 °F (36 °C) (Temporal)   Resp 16   Ht 5' 9\" (1.753 m)   Wt 185 lb 12.8 oz (84.3 kg)   SpO2 97%   BMI 27.44 kg/m²       An electronic signature was used to authenticate this note.   -- Verba Setting, APRN - CNP

## 2023-02-15 ENCOUNTER — PATIENT MESSAGE (OUTPATIENT)
Dept: FAMILY MEDICINE CLINIC | Age: 28
End: 2023-02-15

## 2023-02-15 ENCOUNTER — TELEPHONE (OUTPATIENT)
Dept: PRIMARY CARE CLINIC | Age: 28
End: 2023-02-15

## 2023-02-15 DIAGNOSIS — F90.0 ADHD, PREDOMINANTLY INATTENTIVE TYPE: ICD-10-CM

## 2023-02-15 NOTE — TELEPHONE ENCOUNTER
Konrad Hallman a my chart message that the information from his annual physical was sent to him through his my chart.

## 2023-02-15 NOTE — TELEPHONE ENCOUNTER
From: Kamille Kimball  To: Cathy Beaulieu  Sent: 2/15/2023 12:22 PM EST  Subject: ADHD Medication    Ms. Masterson,    In my wellness appointment with you last November you provided me with an initial Focalin prescription, but expressed that there are potentially better options than that available these days, and that if I wished to continue on an ADHD medication I should talk my primary care physician and schedule an appointment specifically concerned with how to proceed in the future. I reached out per that recommendation and discovered that my doctor is currently out on maternity leave. Her office recommended I reach back out to you! The medication has definitely helped and I would like to continue with it, but I am also interested in learning about those other options you indicated might be available in case there is something that might be a better fit than what was prescribed in my childhood. Could you advise on what my next steps should be down this path? I am not sure exactly what type of appointment, if any, I should be scheduling, nor with whom I should be scheduling it. Any direction/assistance you could provide would be very much appreciated!     Thank you!!

## 2023-03-08 RX ORDER — DEXMETHYLPHENIDATE HYDROCHLORIDE 20 MG/1
20 CAPSULE, EXTENDED RELEASE ORAL DAILY
Qty: 30 CAPSULE | Refills: 0 | Status: SHIPPED | OUTPATIENT
Start: 2023-03-08 | End: 2023-04-07

## 2023-03-08 RX ORDER — DEXMETHYLPHENIDATE HYDROCHLORIDE 5 MG/1
5 TABLET ORAL DAILY PRN
Qty: 30 TABLET | Refills: 0 | Status: SHIPPED | OUTPATIENT
Start: 2023-03-08 | End: 2023-04-07

## 2023-03-13 DIAGNOSIS — R29.890 DECREASED BODY HEIGHT: ICD-10-CM

## 2023-03-13 DIAGNOSIS — S22.000A COMPRESSION FRACTURE OF BODY OF THORACIC VERTEBRA (HCC): ICD-10-CM

## 2023-03-13 DIAGNOSIS — M89.9 BONE DISORDER: ICD-10-CM

## 2023-03-13 DIAGNOSIS — R79.89 ELEVATED LIVER FUNCTION TESTS: ICD-10-CM

## 2023-03-13 LAB
A/G RATIO: 1.8 (ref 1.1–2.2)
ALBUMIN SERPL-MCNC: 4.5 G/DL (ref 3.4–5)
ALP BLD-CCNC: 48 U/L (ref 40–129)
ALT SERPL-CCNC: 36 U/L (ref 10–40)
ANION GAP SERPL CALCULATED.3IONS-SCNC: 12 MMOL/L (ref 3–16)
AST SERPL-CCNC: 36 U/L (ref 15–37)
BILIRUB SERPL-MCNC: 0.4 MG/DL (ref 0–1)
BUN BLDV-MCNC: 23 MG/DL (ref 7–20)
CALCIUM SERPL-MCNC: 9.2 MG/DL (ref 8.3–10.6)
CHLORIDE BLD-SCNC: 102 MMOL/L (ref 99–110)
CO2: 26 MMOL/L (ref 21–32)
CREAT SERPL-MCNC: 1 MG/DL (ref 0.9–1.3)
GFR SERPL CREATININE-BSD FRML MDRD: >60 ML/MIN/{1.73_M2}
GLUCOSE BLD-MCNC: 90 MG/DL (ref 70–99)
HCT VFR BLD CALC: 43.9 % (ref 40.5–52.5)
HEMOGLOBIN: 14.9 G/DL (ref 13.5–17.5)
MCH RBC QN AUTO: 30 PG (ref 26–34)
MCHC RBC AUTO-ENTMCNC: 33.9 G/DL (ref 31–36)
MCV RBC AUTO: 88.4 FL (ref 80–100)
PDW BLD-RTO: 12.8 % (ref 12.4–15.4)
PLATELET # BLD: 169 K/UL (ref 135–450)
PMV BLD AUTO: 10.4 FL (ref 5–10.5)
POTASSIUM SERPL-SCNC: 4.6 MMOL/L (ref 3.5–5.1)
RBC # BLD: 4.97 M/UL (ref 4.2–5.9)
SODIUM BLD-SCNC: 140 MMOL/L (ref 136–145)
TOTAL PROTEIN: 7 G/DL (ref 6.4–8.2)
VITAMIN D 25-HYDROXY: 56.3 NG/ML
WBC # BLD: 5.7 K/UL (ref 4–11)

## 2023-03-16 ENCOUNTER — OFFICE VISIT (OUTPATIENT)
Dept: ENDOCRINOLOGY | Age: 28
End: 2023-03-16
Payer: COMMERCIAL

## 2023-03-16 VITALS
HEIGHT: 69 IN | RESPIRATION RATE: 14 BRPM | WEIGHT: 189 LBS | BODY MASS INDEX: 27.99 KG/M2 | DIASTOLIC BLOOD PRESSURE: 86 MMHG | HEART RATE: 57 BPM | SYSTOLIC BLOOD PRESSURE: 132 MMHG | TEMPERATURE: 98 F | OXYGEN SATURATION: 97 %

## 2023-03-16 DIAGNOSIS — S22.000A COMPRESSION FRACTURE OF BODY OF THORACIC VERTEBRA (HCC): Primary | ICD-10-CM

## 2023-03-16 DIAGNOSIS — R29.890 DECREASED BODY HEIGHT: ICD-10-CM

## 2023-03-16 DIAGNOSIS — R79.89 ELEVATED LIVER FUNCTION TESTS: ICD-10-CM

## 2023-03-16 DIAGNOSIS — M89.9 BONE DISORDER: ICD-10-CM

## 2023-03-16 PROCEDURE — 99215 OFFICE O/P EST HI 40 MIN: CPT | Performed by: INTERNAL MEDICINE

## 2023-03-16 RX ORDER — ALENDRONATE SODIUM 70 MG/1
70 TABLET ORAL
Qty: 12 TABLET | Refills: 3 | Status: SHIPPED | OUTPATIENT
Start: 2023-03-16

## 2023-03-16 NOTE — PROGRESS NOTES
SUBJECTIVE:  Frankey Keller is a 25 y.o. male who is being evaluated for compression fracture of thoracic vertebra for endocrinology related bone disorder. 1. Compression fracture of body of thoracic vertebra (HCC)  This started in 9/2021. Patient was diagnosed with thoracic vertebra compression fracture. The problem has been unchanged. Patient started medication in N/A. Currently patient is on: N/A. Misses N/A doses a month. Current complaints: back pain, shoulder pain  Has joint pains, knee, elbow, wrist pains, mostly activity related. Back pain and shoulder pain are related to previous trauma. On 8/16/2021 patient had 7 miles running race and the developed severe rhabdomyolysis and hyponatremia. At that time he had seizure, memory loss. Patient was unconscious. Patient had restraints for all night and was trying to get out. He developed a dislocation of both shoulders, fractures of both shoulders. His back pain started at that time, most likely presenting compression fracture. He had several surgeries and bone grafts. He was hospitalized for 1 month. Since then patient is 2 inches shorter. He had pain after surgery. He is currently doing physical therapy at home. Patient states that PT does not improve his pain significantly. He only had very slightly improvement in 6 months. Patient saw spinal surgeon for consultation. The recommendation was to do physical therapy. Had physical therapy  Not much improvement    Patient denies family history of bone disorders or osteoporosis. Patient has been always active with the sports and activities. He usually follows keto diet in January for 2 weeks. No RA, using steroids long term, smoking history. No spine or hip fractures in mother or father.     History of obstructive symptoms: difficulty swallowing No, changes in voice/hoarseness No.  History of radiation to patient's neck: No  Resent iodine exposure: No  Family history includes no thyroid abnormalities. Family history of thyroid cancer: No    Patient to cut testosterone booster prior to the injury, but not afterwards. He is not sure what was in the supplement. He felt better on it. Patient denies problems with libido or erectile function. He takes vitamin D 2000 international units 1-2 times daily. Takes calcium  In MVI    2. Bone disorder  Patient was referred for endocrinology consultation regarding compression fracture to evaluate for possible bone disorder. He had injuries prior to thoracic vertebral fracture per patient report. Mostly related to activities and sports. I reviewed all available laboratory and imaging data. There was no record of reported thoracic spine compression fracture prior to trauma what he experienced when restrained. 3.  Elevated liver function test  No nausea, vomiting, abdominal pain    4. Decreased body height  Patient is 2 inches shorter compared with the prior trauma height    5. Polydipsia  Patient stated that he has increased thirst and water intake. He tries to limit his water intake during the day. However, he prefers to drink more fluids. His urine output is increased. Previous 24-hour urine volume 2600 mL      EXAMINATION:   CT OF THE THORACIC SPINE WITHOUT CONTRAST  1/24/2022 7:41 am:       TECHNIQUE:   CT of the thoracic spine was performed without the administration of   intravenous contrast. Multiplanar reformatted images are provided for review. Dose modulation, iterative reconstruction, and/or weight based adjustment of   the mA/kV was utilized to reduce the radiation dose to as low as reasonably   achievable. COMPARISON:   None.        HISTORY:   ORDERING SYSTEM PROVIDED HISTORY: Chronic midline thoracic back pain   TECHNOLOGIST PROVIDED HISTORY:   Reason for Exam: loss of 2 inches in height in 6 month time span   Relevant Medical/Surgical History: no hx of surg to back       FINDINGS:   BONES/ALIGNMENT: There is mild wedge compression fracture deformity involving   the superior endplates of T5, T6 and T7. There is approximately 15% loss of   height at T5, 50% loss of height at T6 and less than 10% loss of height at   T7. There is no retropulsion. Mild disc space narrowing and endplate   osteophyte formation is present at these levels. DEGENERATIVE CHANGES: No gross spinal canal stenosis or bony neural foraminal   narrowing of the thoracic spine. SOFT TISSUES: No paraspinal mass is seen. Impression   Mild chronic midthoracic compression fractures/spondylosis. EXAMINATION:   BONE DENSITOMETRY       1/31/2022 1:51 pm       TECHNIQUE:   A bone density DEXA scan was performed of the lumbar spine and left hip on a   [employer] system. COMPARISON:   None. HISTORY:   ORDERING SYSTEM PROVIDED HISTORY: Compression fracture of body of thoracic   vertebra [facility])       78-year-old male with compression fracture of body of thoracic vertebra       FINDINGS:   LUMBAR SPINE:       The bone mineral density in the lumbar spine including the L1-L4 levels is   measured at 1.048 g/cm2, which corresponds to a T-score of -0.4 and a Z-score   of -0.4. This is within the normal range by WHO criteria. LEFT HIP:       The bone mineral density in the total hip is measured at 1.115 g/cm2   corresponding to a T-score of 0.5 and a Z-score of 0.6. This is within the   normal range by WHO criteria. The bone mineral density of the femoral neck is measured at 0.944 g/cm2   corresponding to a T-score of 0.1 and a Z-score of 0.2. This is within the   normal range by WHO criteria. Impression   Normal bone mineral density by WHO criteria. Fracture risk is low. RECOMMENDATIONS:   Unavailable         1.  Small corner fracture or remote bony defect along the upper margin lunate near the triquetrum without active edema or acute fracture in the wrist.       2. No joint effusion or capsular injury identified. The TFCC remains intact. Flexor and extensor tendons appear normal. No carpal ganglion on or midcarpal instability appreciated. Normal brain parenchyma without acute infarct, mass lesion, hemorrhage or abnormal enhancement. ATTESTATION: Sheri Wilson, as teaching physician have reviewed the images, if any, for this patient's exam, and if necessary, have edited the report originally created by Jesus Warren. Narrative    MRI BRAIN WITH AND WITHOUT CONTRAST   Review of the Electronic Medical Record reveals an additional history of: Presents with seizure. TECHNIQUE:   Multi-sequence, multi-planar MRI of the brain was performed before and after intravenous contrast.     COMPARISON: None     FINDINGS:     Brain Parenchyma: Normal. No evidence of acute infarct, mass lesion, or hemorrhage. Ventricular System and Extra-Axial Spaces: Normal. No evidence of midline shift or hydrocephalus. Extracranial Structures: Arterial flow voids in the skull base are present. Mild paranasal sinus mucosal disease. Other Result Text    Lizzy Scott MD - 08/17/2021   Formatting of this note might be different from the original.   MRI BRAIN WITH AND WITHOUT CONTRAST   Review of the Electronic Medical Record reveals an additional history of: Presents with seizure. TECHNIQUE:   Multi-sequence, multi-planar MRI of the brain was performed before and after intravenous contrast.     COMPARISON: None     FINDINGS:     Brain Parenchyma: Normal. No evidence of acute infarct, mass lesion, or hemorrhage. Ventricular System and Extra-Axial Spaces: Normal. No evidence of midline shift or hydrocephalus. Extracranial Structures: Arterial flow voids in the skull base are present. Mild paranasal sinus mucosal disease. IMPRESSION:     Normal brain parenchyma without acute infarct, mass lesion, hemorrhage or abnormal enhancement.    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    Rhabdomyolysis 08/25/2021    Seizure [facility])      Patient Active Problem List    Diagnosis Date Noted    Elevated liver function tests 05/03/2022    Decreased body height 05/03/2022    Compression fracture of body of thoracic vertebra ([facility] [address] 04/04/2022    Bone disorder 04/04/2022    Wedge compression fracture of unspecified thoracic vertebra, initial encounter for open fracture ([facility] [address] 02/10/2022    ADHD, predominantly inattentive type 05/17/2006     Past Surgical History:   Procedure Laterality Date    SHOULDER SURGERY Bilateral 09/2021     Family History   Problem Relation Age of Onset    No Known Problems Mother     No Known Problems Father     No Known Problems Brother     No Known Problems Maternal Grandmother     No Known Problems Maternal Grandfather     No Known Problems Paternal Grandmother     No Known Problems Paternal Grandfather     No Known Problems Brother      Social History     Socioeconomic History    Marital status: Single     Spouse name: None    Number of children: None    Years of education: None    Highest education level: None   Occupational History    Occupation:     Tobacco Use    Smoking status: Never    Smokeless tobacco: Never   Vaping Use    Vaping Use: Never used   Substance and Sexual Activity    Alcohol use: Not Currently     Comment: couple times a week    Drug use: No    Sexual activity: Yes     Partners: Female     Current Outpatient Medications   Medication Sig Dispense Refill    alendronate (FOSAMAX) 70 MG tablet Take 1 tablet by mouth every 7 days Take once per week in the morning with a full glass of water, on an empty stomach, and do not take anything else by mouth or lie down for the next 30 minutes. 12 tablet 3    Dexmethylphenidate HCl ER (FOCALIN XR) 20 MG CP24 Take 1 capsule by mouth daily for 30 days.  30 capsule 0    dexmethylphenidate (FOCALIN) 5 MG tablet Take 1 tablet by mouth daily as needed (concentration) for up to 30 days. 30 tablet 0    MISC NATURAL PRODUCTS PO Take by mouth Prime T - testosterone supplement. Nutritional Supplements (CREATINE) 750 MG CAPS Take by mouth Dinosaur Brand Supplement      Multiple Vitamins-Minerals (VITAMINS TO GO MEN PO) Take by mouth      vitamin C (ASCORBIC ACID) 500 MG tablet Take 500 mg by mouth daily      Cholecalciferol (VITAMIN D) 50 MCG (2000 UT) CAPS capsule Take by mouth      turmeric 500 MG CAPS Take 1 capsule by mouth daily      Potassium (POTASSIMIN PO) Take by mouth      Omega-3 Fatty Acids (FISH OIL) 1000 MG CAPS Take 3,000 mg by mouth daily        No current facility-administered medications for this visit.      No Known Allergies  Family Status   Relation Name Status    Mother  Alive    Father  Alive    Brother  Alive    MGM      MGF      PGM      PGF  Alive    Brother  Alive       Review of Systems:  Constitutional: has fatigue, no fever, no recent weight gain, no recent weight loss, no changes in appetite  Eyes: no eye pain, no change in vision, no eye redness, no eye irritation, no double vision  Ears, nose, throat: no nasal congestion, no sore throat, no earache, no decrease in hearing, no hoarseness, no dry mouth, no sinus problems, no difficulty swallowing, no neck lumps, no dental problems, no mouth sores, has ringing in ears  Pulmonary: no shortness of breath, no wheezing, no dyspnea on exertion, no cough  Cardiovascular: no chest pain, no lower extremity edema, no orthopnea, no intermittent leg claudication, no palpitations  Gastrointestinal: no abdominal pain, no nausea, no vomiting, no diarrhea, no constipation, no dysphagia, no heartburn, no bloating  Genitourinary: no dysuria, no urinary incontinence, no urinary hesitancy, no urinary frequency, no feelings of urinary urgency, no nocturia  Musculoskeletal: no joint swelling, no joint stiffness, has joint pain, no muscle cramps, no muscle pain  Integument/Breast: no skin rashes, no skin lesions, no itching, no dry skin  Neurological: no numbness, no tingling, no weakness, no confusion, no headaches, no dizziness, no fainting, no tremors, no decrease in memory, no balance problems  Psychiatric: no anxiety, no depression, no insomnia  Hematologic/Lymphatic: no tendency for easy bleeding, no swollen lymph nodes, no tendency for easy bruising  Immunology: no seasonal allergies, no frequent infections, no frequent illnesses  Endocrine: no temperature intolerance    /86   Pulse 57   Temp 98 °F (36.7 °C)   Resp 14   Ht 5' 9\" (1.753 m)   Wt 189 lb (85.7 kg)   SpO2 97%   BMI 27.91 kg/m²    Wt Readings from Last 3 Encounters:   03/16/23 189 lb (85.7 kg)   11/29/22 185 lb 12.8 oz (84.3 kg)   05/03/22 171 lb (77.6 kg)     Body mass index is 27.91 kg/m².     OBJECTIVE:  Constitutional: no acute distress, well appearing and well nourished  Psychiatric: oriented to person, place and time, judgement and insight and normal, recent and remote memory and intact and mood and affect are normal  Skin: skin and subcutaneous tissue is normal without mass, normal turgor  Head and Face: examination of head and face revealed no abnormalities  Eyes: no lid or conjunctival swelling, erythema or discharge, pupils are normal, equal, round, reactive to light  Ears/Nose: external inspection of ears and nose revealed no abnormalities, hearing is grossly normal  Oropharynx/Mouth/Face: lips, tongue and gums are normal with no lesions, the voice quality was normal  Neck: neck is supple and symmetric, with midline trachea and no masses, thyroid is normal  Lymphatics: normal cervical lymph nodes, normal supraclavicular nodes  Pulmonary: no increased work of breathing or signs of respiratory distress, lungs are clear to auscultation  Cardiovascular: normal heart rate and rhythm, normal S1 and S2, no murmurs and pedal pulses and 2+ bilaterally, No edema  Abdomen: abdomen is soft, non-tender with no masses  Musculoskeletal: normal gait and station and exam of the digits and nails are normal, no tenderness over thoracic spine area on palpation  Neurological: normal coordination and normal general cortical function      Lab Review:    Lab Results   Component Value Date/Time    WBC 5.7 03/13/2023 08:25 AM    HGB 14.9 03/13/2023 08:25 AM    HCT 43.9 03/13/2023 08:25 AM    MCV 88.4 03/13/2023 08:25 AM     03/13/2023 08:25 AM     Lab Results   Component Value Date/Time     03/13/2023 08:25 AM    K 4.6 03/13/2023 08:25 AM     03/13/2023 08:25 AM    CO2 26 03/13/2023 08:25 AM    BUN 23 03/13/2023 08:25 AM    CREATININE 1.0 03/13/2023 08:25 AM    GLUCOSE 90 03/13/2023 08:25 AM    CALCIUM 9.2 03/13/2023 08:25 AM    PROT 7.0 03/13/2023 08:25 AM    LABALBU 4.5 03/13/2023 08:25 AM    BILITOT 0.4 03/13/2023 08:25 AM    ALKPHOS 48 03/13/2023 08:25 AM    AST 36 03/13/2023 08:25 AM    ALT 36 03/13/2023 08:25 AM    LABGLOM >60 03/13/2023 08:25 AM    GFRAA >60 05/02/2022 09:51 AM    AGRATIO 1.8 03/13/2023 08:25 AM    GLOB 2.6 02/23/2021 12:48 PM     Lab Results   Component Value Date/Time    TSHFT4 1.45 02/23/2021 12:48 PM     Lab Results   Component Value Date/Time    LABA1C 4.8 02/23/2021 12:48 PM     Lab Results   Component Value Date/Time    EAG 91.1 02/23/2021 12:48 PM     Lab Results   Component Value Date/Time    CHOL 165 02/23/2021 12:48 PM     Lab Results   Component Value Date/Time    TRIG 56 02/23/2021 12:48 PM     Lab Results   Component Value Date/Time    HDL 66 02/23/2021 12:48 PM     Lab Results   Component Value Date/Time    LDLCALC 88 02/23/2021 12:48 PM     Lab Results   Component Value Date/Time    LABVLDL 11 02/23/2021 12:48 PM     No results found for: [facility]  Lab Results   Component Value Date/Time    LABMICR YES 05/15/2017 07:20 PM     Lab Results   Component Value Date/Time    VITD25 56.3 03/13/2023 08:25 AM        ASSESSMENT/PLAN:  1.  Compression fracture of body of thoracic vertebra (HCC)  Continue alendronate 70 mg weekly  Started 5/2022  Counseled about side effects  Obtain lab work and DEXA scan in 1 year. Then reevaluate. Obtain lab work, 24-hour urine collection, DEXA in 1 year, then reevaluate  Patient stopped testosterone before, he states testosterone was normal  Provided patient with calcium calculator. Asked patient to calculate his calcium intake in his diet. That is required to better evaluate his calcium needs. Also asked to verify how much calcium is in his multivitamin. CT thoracic spine 1/24/2022  BONES/ALIGNMENT: There is mild wedge compression fracture deformity involving   the superior endplates of T5, T6 and T7. There is approximately 15% loss of   height at T5, 50% loss of height at T6 and less than 10% loss of height at   T7. Chest x-ray dated 11/24/2021 does not describe spine bone abnormality    DEXA scan was normal.  No family history of bone disorders per patient report. Creatinine clearance 107  24-hour urine cortisol pending  Testosterone pending  Protein electrophoresis normal  Patient was taking testosterone supplement from internet, he understands the risks  24-hour urine 226, sodium 73  Hemoglobin 14.6  Hematocrit 43.2  25 hydroxy vitamin D 55.5-56.3  Prolactin 11  PTH 24.0  Phosphorus 4.6  Glucose 7.5  Calcium 10.0-9.2, upper limit normal 10.6  ALT 26  AST 63  - PTH, Intact; Future  - Phosphorus; Future  - Vitamin D 25 Hydroxy; Future  - Testosterone,  w SHBG Adult Male; Future  - CBC; Future  - Comprehensive Metabolic Panel; Future    2. Bone disorder    - PTH, Intact; Future  - Phosphorus; Future  - Vitamin D 25 Hydroxy; Future  - Testosterone,  w SHBG Adult Male; Future  - CBC; Future  - Comprehensive Metabolic Panel; Future    3. Elevated liver enzymes  Patient was informed about testosterone preparation  ALT 26  AST 63  - CMP    4.   Decreased in body height  Patient is 2 inches shorter compared with his height prior to trauma and compression fracture    5. Polydipsia  Patient stated that he has increased thirst and water intake. He tries to limit his water intake during the day. However, he prefers to drink more fluids. His urine output is increased. Previous 24-hour urine volume 2600 mL  Advised patient to obtain testing after fluid restriction overnight and in the morning. Call for results  -Plasma osmolality  - Sodium  - Renal panel  - Urine osmolality  Advised to collect 24-hour urine collection and measure 24-hour urine intake to evaluate water balance  Send me a message with results. New problem    Reviewed and/or ordered clinical lab results Yes  Reviewed and/or ordered radiology tests Yes   Reviewed and/or ordered other diagnostic tests No  Discussed test results with performing physician No  Independently reviewed image, tracing, or specimen No  Made a decision to obtain old records No  Reviewed and summarized old records Yes   Calcium 9.4  Creatinine 1.1  GFR more than 60  25 hydroxy vitamin D 36  PTH 53  Ionized calcium 1.02, low  Calcium 7.4 in 2021  Cortisol 9.6  CPK 94833 in 2021  Obtained history from other than patient No    Yuriy Paulino was counseled regarding symptoms of compression fracture, bone disorder presenting with compression fracture diagnosis, course and complications of disease if inadequately treated, diagnosis, treatment options, and prognosis, risks, benefits, complications, and alternatives of treatment, labs, imaging and other studies and treatment targets and goals, work-up, polydipsia, polyuria, evaluation. He understands instructions and counseling. Total time I spent for this encounter 40 minutes    Return in about 1 year (around 3/16/2024) for fractures, bone disorder.     Electronically signed by Sara Chamberlain MD on 3/17/2023 at 12:00 AM

## 2023-06-28 ENCOUNTER — PATIENT MESSAGE (OUTPATIENT)
Dept: PRIMARY CARE CLINIC | Age: 28
End: 2023-06-28

## 2023-06-28 DIAGNOSIS — F90.0 ADHD, PREDOMINANTLY INATTENTIVE TYPE: Primary | ICD-10-CM

## 2023-06-28 RX ORDER — DEXMETHYLPHENIDATE HYDROCHLORIDE 20 MG/1
20 CAPSULE, EXTENDED RELEASE ORAL EVERY MORNING
Qty: 30 CAPSULE | Refills: 0 | Status: SHIPPED | OUTPATIENT
Start: 2023-06-29 | End: 2023-07-29

## 2023-06-28 RX ORDER — DEXMETHYLPHENIDATE HYDROCHLORIDE 20 MG/1
20 CAPSULE, EXTENDED RELEASE ORAL EVERY MORNING
Qty: 30 CAPSULE | Refills: 0 | Status: SHIPPED | OUTPATIENT
Start: 2023-07-28 | End: 2023-08-27

## 2023-06-28 RX ORDER — DEXMETHYLPHENIDATE HYDROCHLORIDE 5 MG/1
5 TABLET ORAL
Qty: 30 TABLET | Refills: 0 | Status: SHIPPED | OUTPATIENT
Start: 2023-06-29 | End: 2023-07-29

## 2023-06-28 RX ORDER — DEXMETHYLPHENIDATE HYDROCHLORIDE 5 MG/1
5 TABLET ORAL
Qty: 30 TABLET | Refills: 0 | Status: SHIPPED | OUTPATIENT
Start: 2023-07-29 | End: 2023-08-28

## 2023-06-28 RX ORDER — DEXMETHYLPHENIDATE HYDROCHLORIDE 20 MG/1
20 CAPSULE, EXTENDED RELEASE ORAL EVERY MORNING
Qty: 30 CAPSULE | Refills: 0 | Status: SHIPPED | OUTPATIENT
Start: 2023-08-28 | End: 2023-09-27

## 2023-06-28 RX ORDER — DEXMETHYLPHENIDATE HYDROCHLORIDE 5 MG/1
5 TABLET ORAL
Qty: 30 TABLET | Refills: 0 | Status: SHIPPED | OUTPATIENT
Start: 2023-08-29 | End: 2023-09-28

## 2023-07-12 DIAGNOSIS — F90.0 ADHD, PREDOMINANTLY INATTENTIVE TYPE: Primary | ICD-10-CM

## 2023-07-12 RX ORDER — DEXMETHYLPHENIDATE HYDROCHLORIDE 2.5 MG/1
5 TABLET ORAL
Qty: 58 TABLET | Refills: 0 | Status: SHIPPED | OUTPATIENT
Start: 2023-07-12 | End: 2023-08-10

## 2023-07-12 RX ORDER — DEXMETHYLPHENIDATE HYDROCHLORIDE 10 MG/1
20 TABLET ORAL
Qty: 58 TABLET | Refills: 0 | Status: SHIPPED | OUTPATIENT
Start: 2023-08-11 | End: 2023-09-09

## 2023-07-12 RX ORDER — DEXMETHYLPHENIDATE HYDROCHLORIDE 10 MG/1
20 TABLET ORAL
Qty: 58 TABLET | Refills: 0 | Status: SHIPPED | OUTPATIENT
Start: 2023-07-12 | End: 2023-08-10

## 2023-07-12 RX ORDER — DEXMETHYLPHENIDATE HYDROCHLORIDE 10 MG/1
20 TABLET ORAL
Qty: 58 TABLET | Refills: 0 | Status: SHIPPED | OUTPATIENT
Start: 2023-09-10 | End: 2023-10-09

## 2023-07-12 RX ORDER — DEXMETHYLPHENIDATE HYDROCHLORIDE 2.5 MG/1
5 TABLET ORAL
Qty: 58 TABLET | Refills: 0 | Status: SHIPPED | OUTPATIENT
Start: 2023-08-11 | End: 2023-09-09

## 2023-07-12 RX ORDER — DEXMETHYLPHENIDATE HYDROCHLORIDE 2.5 MG/1
5 TABLET ORAL
Qty: 58 TABLET | Refills: 0 | Status: SHIPPED | OUTPATIENT
Start: 2023-09-10 | End: 2023-10-09

## 2023-09-16 DIAGNOSIS — F90.0 ADHD, PREDOMINANTLY INATTENTIVE TYPE: ICD-10-CM

## 2023-09-17 RX ORDER — ATOMOXETINE 60 MG/1
60 CAPSULE ORAL DAILY
Qty: 7 CAPSULE | Refills: 0 | OUTPATIENT
Start: 2023-09-17

## 2023-09-25 ENCOUNTER — OFFICE VISIT (OUTPATIENT)
Dept: PRIMARY CARE CLINIC | Age: 28
End: 2023-09-25
Payer: COMMERCIAL

## 2023-09-25 VITALS
TEMPERATURE: 97.9 F | DIASTOLIC BLOOD PRESSURE: 68 MMHG | WEIGHT: 188.2 LBS | OXYGEN SATURATION: 100 % | HEIGHT: 69 IN | HEART RATE: 52 BPM | BODY MASS INDEX: 27.88 KG/M2 | SYSTOLIC BLOOD PRESSURE: 124 MMHG | RESPIRATION RATE: 16 BRPM

## 2023-09-25 DIAGNOSIS — Z00.00 ENCOUNTER FOR WELL ADULT EXAM WITHOUT ABNORMAL FINDINGS: Primary | ICD-10-CM

## 2023-09-25 DIAGNOSIS — F90.0 ADHD, PREDOMINANTLY INATTENTIVE TYPE: ICD-10-CM

## 2023-09-25 PROCEDURE — 99395 PREV VISIT EST AGE 18-39: CPT | Performed by: FAMILY MEDICINE

## 2023-09-27 ASSESSMENT — ENCOUNTER SYMPTOMS
SORE THROAT: 0
SHORTNESS OF BREATH: 0
CHEST TIGHTNESS: 0
CONSTIPATION: 0
ABDOMINAL PAIN: 0
RHINORRHEA: 0
DIARRHEA: 0

## 2023-10-27 DIAGNOSIS — F90.0 ADHD, PREDOMINANTLY INATTENTIVE TYPE: ICD-10-CM

## 2023-10-27 NOTE — TELEPHONE ENCOUNTER
Medication:   Requested Prescriptions     Pending Prescriptions Disp Refills    dexmethylphenidate (FOCALIN) 2.5 MG tablet 58 tablet 0     Sig: Take 2 tablets by mouth Daily with lunch for 29 days. Max Daily Amount: 5 mg    Dexmethylphenidate HCl ER 10 MG CP24 30 capsule 0     Sig: Take 1 capsule by mouth daily for 30 days. Max Daily Amount: 10 mg        Patient Phone Number: [phone] (home)     Last appt: 9/25/2023   Next appt: Visit date not found    Last OARRS:       11/29/2022     8:52 AM   RX Monitoring   Periodic Controlled Substance Monitoring Possible medication side effects, risk of tolerance/dependence & alternative treatments discussed. ;No signs of potential drug abuse or diversion identified. ;Potential drug abuse or diversion identified, see note documentation.

## 2023-10-28 RX ORDER — DEXMETHYLPHENIDATE HYDROCHLORIDE 2.5 MG/1
5 TABLET ORAL
Qty: 58 TABLET | Refills: 0 | Status: SHIPPED | OUTPATIENT
Start: 2023-10-28 | End: 2023-11-26

## 2023-10-28 RX ORDER — DEXMETHYLPHENIDATE HYDROCHLORIDE 10 MG/1
10 CAPSULE, EXTENDED RELEASE ORAL DAILY
Qty: 30 CAPSULE | Refills: 0 | Status: SHIPPED | OUTPATIENT
Start: 2023-10-28 | End: 2023-11-27

## 2024-02-13 DIAGNOSIS — S22.000A COMPRESSION FRACTURE OF BODY OF THORACIC VERTEBRA (HCC): ICD-10-CM

## 2024-02-13 DIAGNOSIS — R79.89 ELEVATED LIVER FUNCTION TESTS: ICD-10-CM

## 2024-02-13 DIAGNOSIS — R29.890 DECREASED BODY HEIGHT: ICD-10-CM

## 2024-02-13 DIAGNOSIS — M89.9 BONE DISORDER: ICD-10-CM

## 2024-02-15 DIAGNOSIS — M89.9 BONE DISORDER: ICD-10-CM

## 2024-02-15 DIAGNOSIS — R29.890 DECREASED BODY HEIGHT: ICD-10-CM

## 2024-02-15 DIAGNOSIS — S22.000A COMPRESSION FRACTURE OF BODY OF THORACIC VERTEBRA (HCC): ICD-10-CM

## 2024-02-15 DIAGNOSIS — Z00.00 ENCOUNTER FOR WELL ADULT EXAM WITHOUT ABNORMAL FINDINGS: ICD-10-CM

## 2024-02-15 DIAGNOSIS — R79.89 ELEVATED LIVER FUNCTION TESTS: ICD-10-CM

## 2024-02-16 LAB
CHOLEST SERPL-MCNC: 175 MG/DL (ref 0–199)
HDLC SERPL-MCNC: 55 MG/DL (ref 40–60)
LDLC SERPL CALC-MCNC: 109 MG/DL
TRIGL SERPL-MCNC: 56 MG/DL (ref 0–150)
VLDLC SERPL CALC-MCNC: 11 MG/DL

## 2024-02-17 ENCOUNTER — PATIENT MESSAGE (OUTPATIENT)
Dept: PRIMARY CARE CLINIC | Age: 29
End: 2024-02-17

## 2024-02-17 DIAGNOSIS — F90.0 ADHD, PREDOMINANTLY INATTENTIVE TYPE: ICD-10-CM

## 2024-02-17 LAB
SHBG SERPL-SCNC: 43 NMOL/L (ref 17–56)
TESTOST FREE MFR SERPL: 1.7 % (ref 1.6–2.9)
TESTOST FREE SERPL-MCNC: 130 PG/ML (ref 47–244)
TESTOST SERPL-MCNC: 762 NG/DL (ref 300–1080)
TESTOSTERONE.FREE+WB SERPL-MCNC: 377 NG/DL (ref 131–682)

## 2024-02-19 NOTE — TELEPHONE ENCOUNTER
From: Stan Rosenthal  To: Dr. Nano Enriquez  Sent: 2/17/2024 3:38 PM EST  Subject: Medication Refill    Could you please send orders of my ADHD medications to my pharmacy, the [facility] on [facility]?    They are  1. the Dexmethylphenidate 2.5mg TAB, and  2. the Dexmethylphenidate 10mg CP    I am not sure if you have any control over adding notes, but a common issue is that the wrong dosage is given at the pharmacy, for example there will be a 10mg tablet instead of a capsule which is too strong. Any clarity that can be added to the order to ensure the pharmacy understands which dose is for the tablet vs which dose is for the capsule would be very helpful.    Thank you!

## 2024-02-20 RX ORDER — DEXMETHYLPHENIDATE HYDROCHLORIDE 2.5 MG/1
5 TABLET ORAL
Qty: 60 TABLET | Refills: 0 | Status: SHIPPED | OUTPATIENT
Start: 2024-02-20 | End: 2024-03-21

## 2024-02-20 RX ORDER — DEXMETHYLPHENIDATE HYDROCHLORIDE 10 MG/1
10 CAPSULE, EXTENDED RELEASE ORAL DAILY
Qty: 30 CAPSULE | Refills: 0 | Status: SHIPPED | OUTPATIENT
Start: 2024-02-20 | End: 2024-03-21

## 2024-03-19 DIAGNOSIS — M89.9 BONE DISORDER: ICD-10-CM

## 2024-03-19 DIAGNOSIS — S22.000A COMPRESSION FRACTURE OF BODY OF THORACIC VERTEBRA (HCC): ICD-10-CM

## 2024-03-19 DIAGNOSIS — R29.890 DECREASED BODY HEIGHT: ICD-10-CM

## 2024-03-19 DIAGNOSIS — R79.89 ELEVATED LIVER FUNCTION TESTS: ICD-10-CM

## 2024-03-19 LAB — OSMOLALITY UR: 787 MOSM/KG (ref 390–1070)

## 2024-03-20 PROBLEM — R63.1 POLYDIPSIA: Status: ACTIVE | Noted: 2024-03-20

## 2024-03-20 LAB
25(OH)D3 SERPL-MCNC: 48.7 NG/ML
ALBUMIN SERPL-MCNC: 5 G/DL (ref 3.4–5)
ALBUMIN/GLOB SERPL: 2.2 {RATIO} (ref 1.1–2.2)
ALP SERPL-CCNC: 72 U/L (ref 40–129)
ALT SERPL-CCNC: 27 U/L (ref 10–40)
ANION GAP SERPL CALCULATED.3IONS-SCNC: 11 MMOL/L (ref 3–16)
AST SERPL-CCNC: 30 U/L (ref 15–37)
BILIRUB SERPL-MCNC: 0.3 MG/DL (ref 0–1)
BUN SERPL-MCNC: 31 MG/DL (ref 7–20)
CALCIUM SERPL-MCNC: 9.4 MG/DL (ref 8.3–10.6)
CHLORIDE SERPL-SCNC: 101 MMOL/L (ref 99–110)
CO2 SERPL-SCNC: 25 MMOL/L (ref 21–32)
CREAT SERPL-MCNC: 0.9 MG/DL (ref 0.9–1.3)
GFR SERPLBLD CREATININE-BSD FMLA CKD-EPI: >60 ML/MIN/{1.73_M2}
GLUCOSE SERPL-MCNC: 92 MG/DL (ref 70–99)
OSMOLALITY SERPL: 304 MOSM/KG (ref 275–295)
PHOSPHATE SERPL-MCNC: 4.4 MG/DL (ref 2.5–4.9)
POTASSIUM SERPL-SCNC: 5.1 MMOL/L (ref 3.5–5.1)
PROT SERPL-MCNC: 7.3 G/DL (ref 6.4–8.2)
PTH-INTACT SERPL-MCNC: 32.6 PG/ML (ref 14–72)
SODIUM SERPL-SCNC: 137 MMOL/L (ref 136–145)

## 2024-03-21 ENCOUNTER — OFFICE VISIT (OUTPATIENT)
Dept: ENDOCRINOLOGY | Age: 29
End: 2024-03-21
Payer: COMMERCIAL

## 2024-03-21 VITALS
HEIGHT: 69 IN | WEIGHT: 198 LBS | BODY MASS INDEX: 29.33 KG/M2 | TEMPERATURE: 98 F | RESPIRATION RATE: 14 BRPM | OXYGEN SATURATION: 100 % | SYSTOLIC BLOOD PRESSURE: 138 MMHG | DIASTOLIC BLOOD PRESSURE: 89 MMHG | HEART RATE: 60 BPM

## 2024-03-21 DIAGNOSIS — R29.890 DECREASED BODY HEIGHT: ICD-10-CM

## 2024-03-21 DIAGNOSIS — R79.89 ELEVATED LIVER FUNCTION TESTS: ICD-10-CM

## 2024-03-21 DIAGNOSIS — M89.9 BONE DISORDER: ICD-10-CM

## 2024-03-21 DIAGNOSIS — S22.000A COMPRESSION FRACTURE OF BODY OF THORACIC VERTEBRA (HCC): Primary | ICD-10-CM

## 2024-03-21 DIAGNOSIS — R63.1 POLYDIPSIA: ICD-10-CM

## 2024-03-21 LAB
SHBG SERPL-SCNC: 40 NMOL/L (ref 17–56)
TESTOST FREE MFR SERPL: 1.7 % (ref 1.6–2.9)
TESTOST FREE SERPL-MCNC: 113 PG/ML (ref 47–244)
TESTOST SERPL-MCNC: 661 NG/DL (ref 300–1080)
TESTOSTERONE.FREE+WB SERPL-MCNC: 336 NG/DL (ref 131–682)

## 2024-03-21 PROCEDURE — 99214 OFFICE O/P EST MOD 30 MIN: CPT | Performed by: INTERNAL MEDICINE

## 2024-03-21 NOTE — PROGRESS NOTES
incontinence, no urinary hesitancy, no urinary frequency, no feelings of urinary urgency, no nocturia  Musculoskeletal: no joint swelling, no joint stiffness, has joint pain, no muscle cramps, no muscle pain  Integument/Breast: no skin rashes, no skin lesions, no itching, no dry skin  Neurological: no numbness, no tingling, no weakness, no confusion, no headaches, no dizziness, no fainting, no tremors, no decrease in memory, no balance problems  Psychiatric: no anxiety, no depression, no insomnia  Hematologic/Lymphatic: no tendency for easy bleeding, no swollen lymph nodes, no tendency for easy bruising  Immunology: no seasonal allergies, no frequent infections, no frequent illnesses  Endocrine: no temperature intolerance    /89   Pulse 60   Temp 98 °F (36.7 °C)   Resp 14   Ht 1.753 m (5' 9.02\")   Wt 89.8 kg (198 lb)   SpO2 100%   BMI 29.23 kg/m²    Wt Readings from Last 3 Encounters:   03/21/24 89.8 kg (198 lb)   09/25/23 85.4 kg (188 lb 3.2 oz)   06/12/23 82.8 kg (182 lb 8 oz)     Body mass index is 29.23 kg/m².    OBJECTIVE:  Constitutional: no acute distress, well appearing and well nourished  Psychiatric: oriented to person, place and time, judgement and insight and normal, recent and remote memory and intact and mood and affect are normal  Skin: skin and subcutaneous tissue is normal without mass, normal turgor  Head and Face: examination of head and face revealed no abnormalities  Eyes: no lid or conjunctival swelling, erythema or discharge, pupils are normal, equal, round, reactive to light  Ears/Nose: external inspection of ears and nose revealed no abnormalities, hearing is grossly normal  Oropharynx/Mouth/Face: lips, tongue and gums are normal with no lesions, the voice quality was normal  Neck: neck is supple and symmetric, with midline trachea and no masses, thyroid is normal  Lymphatics: normal cervical lymph nodes, normal supraclavicular nodes  Pulmonary: no increased work of breathing or

## 2024-03-22 LAB — RENIN PLAS-CCNC: 3.1 NG/ML/HR

## 2024-04-23 DIAGNOSIS — F90.0 ADHD, PREDOMINANTLY INATTENTIVE TYPE: ICD-10-CM

## 2024-04-23 RX ORDER — DEXMETHYLPHENIDATE HYDROCHLORIDE 2.5 MG/1
5 TABLET ORAL
Qty: 60 TABLET | Refills: 0 | Status: CANCELLED | OUTPATIENT
Start: 2024-04-23 | End: 2024-05-23

## 2024-04-23 RX ORDER — DEXMETHYLPHENIDATE HYDROCHLORIDE 10 MG/1
10 CAPSULE, EXTENDED RELEASE ORAL DAILY
Qty: 30 CAPSULE | Refills: 0 | Status: CANCELLED | OUTPATIENT
Start: 2024-04-23 | End: 2024-05-23

## 2024-05-02 ENCOUNTER — OFFICE VISIT (OUTPATIENT)
Dept: PRIMARY CARE CLINIC | Age: 29
End: 2024-05-02
Payer: COMMERCIAL

## 2024-05-02 VITALS
SYSTOLIC BLOOD PRESSURE: 115 MMHG | HEIGHT: 69 IN | TEMPERATURE: 97.6 F | HEART RATE: 68 BPM | BODY MASS INDEX: 28.87 KG/M2 | RESPIRATION RATE: 16 BRPM | DIASTOLIC BLOOD PRESSURE: 70 MMHG | OXYGEN SATURATION: 100 % | WEIGHT: 194.9 LBS

## 2024-05-02 DIAGNOSIS — F90.0 ADHD, PREDOMINANTLY INATTENTIVE TYPE: ICD-10-CM

## 2024-05-02 PROCEDURE — 99213 OFFICE O/P EST LOW 20 MIN: CPT | Performed by: NURSE PRACTITIONER

## 2024-05-02 RX ORDER — DEXMETHYLPHENIDATE HYDROCHLORIDE 10 MG/1
10 CAPSULE, EXTENDED RELEASE ORAL DAILY
Qty: 30 CAPSULE | Refills: 0 | Status: SHIPPED | OUTPATIENT
Start: 2024-05-02 | End: 2024-06-01

## 2024-05-02 RX ORDER — DEXMETHYLPHENIDATE HYDROCHLORIDE 2.5 MG/1
5 TABLET ORAL
Qty: 60 TABLET | Refills: 0 | Status: SHIPPED | OUTPATIENT
Start: 2024-05-02 | End: 2024-06-01

## 2024-05-02 ASSESSMENT — PATIENT HEALTH QUESTIONNAIRE - PHQ9
2. FEELING DOWN, DEPRESSED OR HOPELESS: NOT AT ALL
SUM OF ALL RESPONSES TO PHQ QUESTIONS 1-9: 0
SUM OF ALL RESPONSES TO PHQ QUESTIONS 1-9: 0
SUM OF ALL RESPONSES TO PHQ9 QUESTIONS 1 & 2: 0
SUM OF ALL RESPONSES TO PHQ QUESTIONS 1-9: 0
SUM OF ALL RESPONSES TO PHQ QUESTIONS 1-9: 0
1. LITTLE INTEREST OR PLEASURE IN DOING THINGS: NOT AT ALL

## 2024-05-02 NOTE — PROGRESS NOTES
Stan Rosenthal (:  1995) is a 29 y.o. male,Established patient, here for evaluation of the following chief complaint(s):  Medication Check and ADHD (Follow up)      Assessment & Plan   1. ADHD, predominantly inattentive type  -     dexmethylphenidate (FOCALIN) 2.5 MG tablet; Take 2 tablets by mouth Daily with lunch for 30 days. Max Daily Amount: 5 mg, Disp-60 tablet, R-0Normal  -     Dexmethylphenidate HCl ER 10 MG CP24; Take 1 capsule by mouth daily for 30 days. Max Daily Amount: 10 mg, Disp-30 capsule, R-0Normal      No follow-ups on file.       Subjective   HPI   Current treatment: Focalin- 2.5mg  and dexmethyphenidate , which has been effective.  Residual symptoms: none. Medication side effects: None. Patient denies None. Patient stated he takes medications as needed   Review of Systems   Constitutional: Negative.    HENT: Negative.     Eyes: Negative.    Respiratory: Negative.     Cardiovascular: Negative.    Gastrointestinal: Negative.    Endocrine: Negative.    Genitourinary: Negative.    Musculoskeletal: Negative.    Skin: Negative.    Neurological: Negative.    Hematological: Negative.    Psychiatric/Behavioral:  Positive for decreased concentration.           Objective   Physical Exam  HENT:      Right Ear: Tympanic membrane and ear canal normal.      Left Ear: Tympanic membrane and ear canal normal.      Nose: Nose normal.      Mouth/Throat:      Mouth: Mucous membranes are moist.   Eyes:      Extraocular Movements: Extraocular movements intact.   Cardiovascular:      Rate and Rhythm: Normal rate.      Pulses: Normal pulses.      Heart sounds: Normal heart sounds.   Pulmonary:      Effort: Pulmonary effort is normal.      Breath sounds: Normal breath sounds.   Abdominal:      General: Bowel sounds are normal.      Palpations: Abdomen is soft.   Musculoskeletal:         General: Normal range of motion.      Cervical back: Normal range of motion.   Skin:     General: Skin is warm.   Neurological:

## 2024-05-24 ASSESSMENT — ENCOUNTER SYMPTOMS
RESPIRATORY NEGATIVE: 1
GASTROINTESTINAL NEGATIVE: 1
EYES NEGATIVE: 1

## 2024-06-11 DIAGNOSIS — F90.0 ADHD, PREDOMINANTLY INATTENTIVE TYPE: ICD-10-CM

## 2024-06-11 RX ORDER — DEXMETHYLPHENIDATE HYDROCHLORIDE 2.5 MG/1
5 TABLET ORAL
Qty: 60 TABLET | Refills: 0 | Status: SHIPPED | OUTPATIENT
Start: 2024-06-11 | End: 2024-07-11

## 2024-06-11 RX ORDER — DEXMETHYLPHENIDATE HYDROCHLORIDE 10 MG/1
10 CAPSULE, EXTENDED RELEASE ORAL DAILY
Qty: 30 CAPSULE | Refills: 0 | Status: SHIPPED | OUTPATIENT
Start: 2024-06-11 | End: 2024-07-11

## 2024-06-11 NOTE — TELEPHONE ENCOUNTER
Medication:   Requested Prescriptions     Pending Prescriptions Disp Refills    dexmethylphenidate (FOCALIN) 2.5 MG tablet 60 tablet 0     Sig: Take 2 tablets by mouth Daily with lunch for 30 days. Max Daily Amount: 5 mg    Dexmethylphenidate HCl ER 10 MG CP24 30 capsule 0     Sig: Take 1 capsule by mouth daily for 30 days. Max Daily Amount: 10 mg        Last Filled:  5/2/24 #30 0 refills    Patient Phone Number: [phone] (home)     Last appt: 5/2/2024   Next appt: 8/8/2024

## 2024-07-18 ENCOUNTER — OFFICE VISIT (OUTPATIENT)
Age: 29
End: 2024-07-18

## 2024-07-18 VITALS
BODY MASS INDEX: 26.36 KG/M2 | HEART RATE: 59 BPM | OXYGEN SATURATION: 97 % | TEMPERATURE: 97.8 F | HEIGHT: 69 IN | DIASTOLIC BLOOD PRESSURE: 72 MMHG | RESPIRATION RATE: 16 BRPM | WEIGHT: 178 LBS | SYSTOLIC BLOOD PRESSURE: 113 MMHG

## 2024-07-18 DIAGNOSIS — M54.6 ACUTE BILATERAL THORACIC BACK PAIN: ICD-10-CM

## 2024-07-18 DIAGNOSIS — M62.830 MUSCLE SPASM OF BACK: Primary | ICD-10-CM

## 2024-07-18 DIAGNOSIS — F90.0 ADHD, PREDOMINANTLY INATTENTIVE TYPE: ICD-10-CM

## 2024-07-18 RX ORDER — METHOCARBAMOL 750 MG/1
750 TABLET, FILM COATED ORAL 4 TIMES DAILY
Qty: 40 TABLET | Refills: 0 | Status: SHIPPED | OUTPATIENT
Start: 2024-07-18 | End: 2024-07-28

## 2024-07-18 RX ORDER — METHYLPREDNISOLONE 4 MG/1
TABLET ORAL
Qty: 1 KIT | Refills: 0 | Status: SHIPPED | OUTPATIENT
Start: 2024-07-18 | End: 2024-07-24

## 2024-07-18 RX ORDER — KETOROLAC TROMETHAMINE 30 MG/ML
60 INJECTION, SOLUTION INTRAMUSCULAR; INTRAVENOUS ONCE
Status: COMPLETED | OUTPATIENT
Start: 2024-07-18 | End: 2024-07-18

## 2024-07-18 RX ADMIN — KETOROLAC TROMETHAMINE 60 MG: 30 INJECTION, SOLUTION INTRAMUSCULAR; INTRAVENOUS at 13:12

## 2024-07-18 ASSESSMENT — ENCOUNTER SYMPTOMS: BACK PAIN: 1

## 2024-07-18 NOTE — PATIENT INSTRUCTIONS
Back Spasms  Toradol injection administered for pain relief  Medrol dose pack prescribed to treat inflammation- do not take any NSAIDS while taking this medication-Acetaminophen is ok  Methocarbamol prescribed for back spasms- do not operate machinery while taking this medication  Alternate applying ice and heat to affected area- caution not to burn skin  For worsening symptoms including change of sensation/ difficulty walking seek care in ER

## 2024-07-18 NOTE — TELEPHONE ENCOUNTER
Medication:   Requested Prescriptions     Pending Prescriptions Disp Refills    dexmethylphenidate (FOCALIN) 2.5 MG tablet 60 tablet 0     Sig: Take 2 tablets by mouth Daily with lunch for 30 days. Max Daily Amount: 5 mg    Dexmethylphenidate HCl ER 10 MG CP24 30 capsule 0     Sig: Take 1 capsule by mouth daily for 30 days. Max Daily Amount: 10 mg        Last Filled:  dexmethylphenidate  6/11/2024 #60  Dexmethylphenidate  6/11/2024      Patient Phone Number: [phone] (home)     Last appt: 5/2/2024   Next appt: 8/8/2024    Last OARRS:       11/29/2022     8:52 AM   RX Monitoring   Periodic Controlled Substance Monitoring Possible medication side effects, risk of tolerance/dependence & alternative treatments discussed.;No signs of potential drug abuse or diversion identified.;Potential drug abuse or diversion identified, see note documentation.

## 2024-07-18 NOTE — PROGRESS NOTES
Stan Rosenthal (: 1995) is a 29 y.o. male, New patient, here for evaluation of the following chief complaint(s):  Back Pain (Spasm in mid back, NKI. Has happened 3 times before. Started yesterday morning. Has taken ibuprofen, muscle relaxer and a valium last night. Is feeling better today.)      ASSESSMENT/PLAN:    ICD-10-CM    1. Muscle spasm of back  M62.830 methocarbamol (ROBAXIN) 750 MG tablet      2. Acute bilateral thoracic back pain  M54.6 ketorolac (TORADOL) injection 60 mg     methylPREDNISolone (MEDROL DOSEPACK) 4 MG tablet          Back pain /spasm    Given recurrent history of thoracic back pain with history of thoracic compression fractures, acute presentation of exacerbation, absence of recent trauma or injury and exam findings of Thoracic pain, decreased ROM, and spasm with positional changes, especially when supine concern for exacerbation of prior back injury.   Low concern for varicella zoster, AAA or aortic dissection, epidural abscess, spinal fracture, UTI, kidney stones, and cauda equina syndrome  No bowel/bladder incontinence or perianal numbness, thus suspicion for acute cord compression was very low.   Intramuscular Toradol provided in clinic to help with the pain and inflammation.  Medrol dose pack prescribed for treatment of the inflammation/impingement  Acetaminophen (tylenol) for pain management while taking steroids  Alternate Ice/or Hot compresses over the lower back for 15-20 minutes, several times per day to help with pain.   Methocarbamol Muscle relaxer provided for noted muscle spasms on exam. Warning against medication induced drowsiness provided.    Declined referral to Orthopedics provided for follow up for continued treatment of the chronic back symptoms.    Strict ED follow up instructions provided.    Discussed PCP follow up for persisting or worsening symptoms, or to return to the clinic if unable to obtain PCP follow up for worsening symptoms.    The patient

## 2024-07-21 RX ORDER — DEXMETHYLPHENIDATE HYDROCHLORIDE 10 MG/1
10 CAPSULE, EXTENDED RELEASE ORAL DAILY
Qty: 30 CAPSULE | Refills: 0 | Status: SHIPPED | OUTPATIENT
Start: 2024-07-21 | End: 2024-08-20

## 2024-07-21 RX ORDER — DEXMETHYLPHENIDATE HYDROCHLORIDE 2.5 MG/1
5 TABLET ORAL
Qty: 60 TABLET | Refills: 0 | Status: SHIPPED | OUTPATIENT
Start: 2024-07-21 | End: 2024-08-20

## 2024-08-08 ENCOUNTER — OFFICE VISIT (OUTPATIENT)
Dept: PRIMARY CARE CLINIC | Age: 29
End: 2024-08-08
Payer: COMMERCIAL

## 2024-08-08 VITALS
WEIGHT: 182 LBS | HEART RATE: 53 BPM | HEIGHT: 69 IN | OXYGEN SATURATION: 98 % | RESPIRATION RATE: 16 BRPM | BODY MASS INDEX: 26.96 KG/M2 | DIASTOLIC BLOOD PRESSURE: 73 MMHG | TEMPERATURE: 97.5 F | SYSTOLIC BLOOD PRESSURE: 114 MMHG

## 2024-08-08 DIAGNOSIS — F90.0 ADHD, PREDOMINANTLY INATTENTIVE TYPE: ICD-10-CM

## 2024-08-08 PROCEDURE — 99214 OFFICE O/P EST MOD 30 MIN: CPT | Performed by: FAMILY MEDICINE

## 2024-08-08 RX ORDER — DEXMETHYLPHENIDATE HYDROCHLORIDE 10 MG/1
10 CAPSULE, EXTENDED RELEASE ORAL DAILY
Qty: 30 CAPSULE | Refills: 0 | Status: CANCELLED | OUTPATIENT
Start: 2024-08-08 | End: 2024-09-07

## 2024-08-08 RX ORDER — DEXMETHYLPHENIDATE HYDROCHLORIDE 2.5 MG/1
5 TABLET ORAL
Qty: 60 TABLET | Refills: 0 | Status: CANCELLED | OUTPATIENT
Start: 2024-08-08 | End: 2024-09-07

## 2024-08-08 RX ORDER — DEXMETHYLPHENIDATE HYDROCHLORIDE 2.5 MG/1
5 TABLET ORAL DAILY
Qty: 60 TABLET | Refills: 0 | Status: SHIPPED | OUTPATIENT
Start: 2024-10-07 | End: 2024-11-06

## 2024-08-08 RX ORDER — DEXMETHYLPHENIDATE HYDROCHLORIDE 10 MG/1
20 CAPSULE, EXTENDED RELEASE ORAL DAILY
Qty: 60 CAPSULE | Refills: 0 | Status: SHIPPED | OUTPATIENT
Start: 2024-09-07 | End: 2024-10-07

## 2024-08-08 RX ORDER — DEXMETHYLPHENIDATE HYDROCHLORIDE 10 MG/1
20 CAPSULE, EXTENDED RELEASE ORAL DAILY
Qty: 60 CAPSULE | Refills: 0 | Status: SHIPPED | OUTPATIENT
Start: 2024-10-07 | End: 2024-11-06

## 2024-08-08 RX ORDER — DEXMETHYLPHENIDATE HYDROCHLORIDE 2.5 MG/1
5 TABLET ORAL DAILY
Qty: 60 TABLET | Refills: 0 | Status: SHIPPED | OUTPATIENT
Start: 2024-08-08 | End: 2024-09-07

## 2024-08-08 RX ORDER — DEXMETHYLPHENIDATE HYDROCHLORIDE 2.5 MG/1
5 TABLET ORAL DAILY
Qty: 60 TABLET | Refills: 0 | Status: SHIPPED | OUTPATIENT
Start: 2024-09-07 | End: 2024-10-07

## 2024-08-08 RX ORDER — DEXMETHYLPHENIDATE HYDROCHLORIDE 10 MG/1
20 CAPSULE, EXTENDED RELEASE ORAL DAILY
Qty: 60 CAPSULE | Refills: 0 | Status: SHIPPED | OUTPATIENT
Start: 2024-08-08 | End: 2024-09-07

## 2024-08-08 SDOH — ECONOMIC STABILITY: FOOD INSECURITY: WITHIN THE PAST 12 MONTHS, YOU WORRIED THAT YOUR FOOD WOULD RUN OUT BEFORE YOU GOT MONEY TO BUY MORE.: NEVER TRUE

## 2024-08-08 SDOH — ECONOMIC STABILITY: INCOME INSECURITY: HOW HARD IS IT FOR YOU TO PAY FOR THE VERY BASICS LIKE FOOD, HOUSING, MEDICAL CARE, AND HEATING?: NOT HARD AT ALL

## 2024-08-08 SDOH — ECONOMIC STABILITY: FOOD INSECURITY: WITHIN THE PAST 12 MONTHS, THE FOOD YOU BOUGHT JUST DIDN'T LAST AND YOU DIDN'T HAVE MONEY TO GET MORE.: NEVER TRUE

## 2024-08-08 SDOH — ECONOMIC STABILITY: HOUSING INSECURITY
IN THE LAST 12 MONTHS, WAS THERE A TIME WHEN YOU DID NOT HAVE A STEADY PLACE TO SLEEP OR SLEPT IN A SHELTER (INCLUDING NOW)?: NO

## 2024-08-08 ASSESSMENT — ENCOUNTER SYMPTOMS
RHINORRHEA: 0
SHORTNESS OF BREATH: 0
SORE THROAT: 0
CONSTIPATION: 0
CHEST TIGHTNESS: 0
ABDOMINAL PAIN: 0
DIARRHEA: 0

## 2024-08-08 NOTE — PROGRESS NOTES
Subjective:   Patient ID: Stan Rosenthal is a 29 y.o. male.  HPI by clinical support staff:   Chief Complaint   Patient presents with    Medication Check    ADHD     F/u      Preliminary data above this line collected by clinical support staff.    ______________________________________________________________________  HPI by Provider:   HPI   Patient presents today for follow-up on ADHD and medication refill.  States that symptoms have been the same and is able to focus better with the medication.  Did skip his dose yesterday and noticed a significant decline in focus.  States that he does feel exhausted by the end of the day and is unsure if it is from the medication.  Has always had poor sleep and has noticed that he has a pressure-like sensation in the forehead most days.  Tries to stay well-hydrated.   Data above this line collected by Provider.    Patient's medications, allergies, past medical, surgical, social and family histories were reviewed and updated as appropriate.  Patient Care Team:  Nano Enriquez MD as PCP - General (Family Medicine)  [facility] Enriquez [facility]  Current Outpatient Medications on File Prior to Visit   Medication Sig Dispense Refill    dexmethylphenidate (FOCALIN) 2.5 MG tablet Take 2 tablets by mouth Daily with lunch for 30 days. Max Daily Amount: 5 mg 60 tablet 0    Dexmethylphenidate HCl ER 10 MG CP24 Take 1 capsule by mouth daily for 30 days. Max Daily Amount: 10 mg 30 capsule 0    MISC NATURAL PRODUCTS PO Take 400 mg by mouth daily Tongkat Ali - 400mg daily      CREATINE PO Take 1,500 mg by mouth daily [place] Brand Supplement      Multiple Vitamins-Minerals (VITAMINS TO GO MEN PO) Take by mouth      Cholecalciferol (VITAMIN D) 50 MCG (2000 UT) CAPS capsule Take by mouth daily      Potassium (POTASSIMIN PO) Take by mouth      Omega-3 Fatty Acids (FISH OIL) 1000 MG CAPS Take 1 capsule by mouth daily       No current facility-administered medications on

## 2024-09-25 ENCOUNTER — OFFICE VISIT (OUTPATIENT)
Dept: PRIMARY CARE CLINIC | Age: 29
End: 2024-09-25
Payer: COMMERCIAL

## 2024-09-25 VITALS
HEIGHT: 69 IN | WEIGHT: 179.4 LBS | OXYGEN SATURATION: 98 % | SYSTOLIC BLOOD PRESSURE: 112 MMHG | RESPIRATION RATE: 16 BRPM | HEART RATE: 62 BPM | DIASTOLIC BLOOD PRESSURE: 68 MMHG | TEMPERATURE: 97.9 F | BODY MASS INDEX: 26.57 KG/M2

## 2024-09-25 DIAGNOSIS — Z00.00 ENCOUNTER FOR WELL ADULT EXAM WITHOUT ABNORMAL FINDINGS: Primary | ICD-10-CM

## 2024-09-25 PROCEDURE — 99395 PREV VISIT EST AGE 18-39: CPT | Performed by: FAMILY MEDICINE

## 2024-09-25 ASSESSMENT — ENCOUNTER SYMPTOMS
RHINORRHEA: 0
CHEST TIGHTNESS: 0
ABDOMINAL PAIN: 0
SORE THROAT: 0
DIARRHEA: 0
SHORTNESS OF BREATH: 0
CONSTIPATION: 0

## 2024-09-26 ENCOUNTER — PATIENT MESSAGE (OUTPATIENT)
Dept: PRIMARY CARE CLINIC | Age: 29
End: 2024-09-26

## 2024-10-01 ENCOUNTER — TELEPHONE (OUTPATIENT)
Dept: PRIMARY CARE CLINIC | Age: 29
End: 2024-10-01

## 2024-10-01 NOTE — TELEPHONE ENCOUNTER
Called patient to let him know he has dropped off the wrong form.  We need a physical form.  He dropped off a disability form instead.

## 2024-12-02 NOTE — TELEPHONE ENCOUNTER
----- Message from Mirian Valentin sent at 11/22/2021 11:10 AM EST -----  Subject: Referral Request    QUESTIONS   Reason for referral request? blood work   Has the physician seen you for this condition before? No   Preferred Specialist (if applicable)? Do you already have an appointment scheduled? No  Additional Information for Provider? Pt would like a phone call when the   order is ready  ---------------------------------------------------------------------------  --------------  CALL BACK INFO  What is the best way for the office to contact you?  OK to leave message on   voicemail  Preferred Call Back Phone Number? [phone]
Left message for patient to call back.
Left message for patient to call back.
Pt has appt 11/24.  Will discuss then
Unsure what this conversation is about,.
Kim Reilly (Resident)

## 2024-12-23 ENCOUNTER — PATIENT MESSAGE (OUTPATIENT)
Dept: PRIMARY CARE CLINIC | Age: 29
End: 2024-12-23

## 2024-12-23 DIAGNOSIS — S22.000A COMPRESSION FRACTURE OF BODY OF THORACIC VERTEBRA (HCC): ICD-10-CM

## 2024-12-23 DIAGNOSIS — Z13.220 LIPID SCREENING: Primary | ICD-10-CM

## 2024-12-23 DIAGNOSIS — M89.9 BONE DISORDER: ICD-10-CM

## 2024-12-23 DIAGNOSIS — R29.890 DECREASED BODY HEIGHT: ICD-10-CM

## 2024-12-23 DIAGNOSIS — R79.89 ELEVATED LIVER FUNCTION TESTS: ICD-10-CM

## 2024-12-24 DIAGNOSIS — R29.890 DECREASED BODY HEIGHT: ICD-10-CM

## 2024-12-24 DIAGNOSIS — M89.9 BONE DISORDER: ICD-10-CM

## 2024-12-24 DIAGNOSIS — S22.000A COMPRESSION FRACTURE OF BODY OF THORACIC VERTEBRA (HCC): ICD-10-CM

## 2024-12-24 DIAGNOSIS — R79.89 ELEVATED LIVER FUNCTION TESTS: ICD-10-CM

## 2024-12-24 LAB
COLLECT DURATION TIME UR: 24 HR
CREAT 24H UR-MRATE: 2.7 G/24HR (ref 0.6–2.5)
CREAT CL 24H UR+SERPL-VRATE: 166 ML/MIN (ref 100–140)
CREAT SERPL-MCNC: 1 MG/DL (ref 0.8–1.3)
SODIUM 24H UR-SRATE: 342 MMOL/24 HR (ref 40–220)
SPECIMEN VOL 24H UR: 3000 ML

## 2024-12-25 LAB
SHBG SERPL-SCNC: 52 NMOL/L (ref 17–56)
TESTOST FREE MFR SERPL: 1.5 % (ref 1.6–2.9)
TESTOST FREE SERPL-MCNC: 111 PG/ML (ref 47–244)
TESTOST SERPL-MCNC: 751 NG/DL (ref 300–1080)
TESTOSTERONE.FREE+WB SERPL-MCNC: 335 NG/DL (ref 131–682)

## 2025-02-04 DIAGNOSIS — M89.9 BONE DISORDER: ICD-10-CM

## 2025-02-04 DIAGNOSIS — Z13.220 LIPID SCREENING: ICD-10-CM

## 2025-02-04 DIAGNOSIS — S22.000A COMPRESSION FRACTURE OF BODY OF THORACIC VERTEBRA (HCC): ICD-10-CM

## 2025-02-04 DIAGNOSIS — S22.000B: Chronic | ICD-10-CM

## 2025-02-04 LAB
CHOLEST SERPL-MCNC: 194 MG/DL (ref 0–199)
HDLC SERPL-MCNC: 64 MG/DL (ref 40–60)
LDLC SERPL CALC-MCNC: 119 MG/DL
TRIGL SERPL-MCNC: 57 MG/DL (ref 0–150)
VLDLC SERPL CALC-MCNC: 11 MG/DL

## 2025-02-06 LAB
SHBG SERPL-SCNC: 72 NMOL/L (ref 10–60)
TESTOST FREE SERPL-MCNC: 128.1 PG/ML (ref 47–244)
TESTOST SERPL-MCNC: 939 NG/DL (ref 249–836)

## 2025-02-08 ENCOUNTER — TELEPHONE (OUTPATIENT)
Dept: ENDOCRINOLOGY | Age: 30
End: 2025-02-08

## 2025-02-09 NOTE — TELEPHONE ENCOUNTER
Please advise patient that his total testosterone level was 939, upper limit normal 939.  Free testosterone was 128.1, in normal range.

## 2025-08-28 ENCOUNTER — TELEMEDICINE (OUTPATIENT)
Dept: PRIMARY CARE CLINIC | Age: 30
End: 2025-08-28
Payer: COMMERCIAL

## 2025-08-28 DIAGNOSIS — F90.0 ADHD, PREDOMINANTLY INATTENTIVE TYPE: Primary | ICD-10-CM

## 2025-08-28 PROCEDURE — 99214 OFFICE O/P EST MOD 30 MIN: CPT | Performed by: FAMILY MEDICINE

## 2025-08-28 RX ORDER — DEXMETHYLPHENIDATE HYDROCHLORIDE 2.5 MG/1
2.5 TABLET ORAL 2 TIMES DAILY
Qty: 60 TABLET | Refills: 0 | Status: SHIPPED | OUTPATIENT
Start: 2025-08-28 | End: 2025-09-27

## 2025-08-28 RX ORDER — DEXMETHYLPHENIDATE HYDROCHLORIDE 10 MG/1
10 CAPSULE, EXTENDED RELEASE ORAL 2 TIMES DAILY
Qty: 60 CAPSULE | Refills: 0 | Status: SHIPPED | OUTPATIENT
Start: 2025-08-28 | End: 2025-09-27

## 2025-08-28 RX ORDER — DEXMETHYLPHENIDATE HYDROCHLORIDE 10 MG/1
10 CAPSULE, EXTENDED RELEASE ORAL 2 TIMES DAILY
Qty: 60 CAPSULE | Refills: 0 | Status: SHIPPED | OUTPATIENT
Start: 2025-10-27 | End: 2025-11-26

## 2025-08-28 RX ORDER — DEXMETHYLPHENIDATE HYDROCHLORIDE 2.5 MG/1
2.5 TABLET ORAL 2 TIMES DAILY
Qty: 60 TABLET | Refills: 0 | Status: SHIPPED | OUTPATIENT
Start: 2025-09-27 | End: 2025-10-27

## 2025-08-28 RX ORDER — DEXMETHYLPHENIDATE HYDROCHLORIDE 2.5 MG/1
2.5 TABLET ORAL 2 TIMES DAILY
Qty: 60 TABLET | Refills: 0 | Status: SHIPPED | OUTPATIENT
Start: 2025-10-27 | End: 2025-11-26

## 2025-08-28 RX ORDER — DEXMETHYLPHENIDATE HYDROCHLORIDE 10 MG/1
10 CAPSULE, EXTENDED RELEASE ORAL 2 TIMES DAILY
Qty: 60 CAPSULE | Refills: 0 | Status: SHIPPED | OUTPATIENT
Start: 2025-09-27 | End: 2025-10-27

## 2025-08-28 SDOH — ECONOMIC STABILITY: TRANSPORTATION INSECURITY
IN THE PAST 12 MONTHS, HAS LACK OF TRANSPORTATION KEPT YOU FROM MEETINGS, WORK, OR FROM GETTING THINGS NEEDED FOR DAILY LIVING?: NO

## 2025-08-28 SDOH — ECONOMIC STABILITY: FOOD INSECURITY: WITHIN THE PAST 12 MONTHS, THE FOOD YOU BOUGHT JUST DIDN'T LAST AND YOU DIDN'T HAVE MONEY TO GET MORE.: NEVER TRUE

## 2025-08-28 SDOH — ECONOMIC STABILITY: INCOME INSECURITY: IN THE LAST 12 MONTHS, WAS THERE A TIME WHEN YOU WERE NOT ABLE TO PAY THE MORTGAGE OR RENT ON TIME?: NO

## 2025-08-28 SDOH — ECONOMIC STABILITY: FOOD INSECURITY: WITHIN THE PAST 12 MONTHS, YOU WORRIED THAT YOUR FOOD WOULD RUN OUT BEFORE YOU GOT MONEY TO BUY MORE.: NEVER TRUE

## 2025-08-28 SDOH — ECONOMIC STABILITY: TRANSPORTATION INSECURITY
IN THE PAST 12 MONTHS, HAS THE LACK OF TRANSPORTATION KEPT YOU FROM MEDICAL APPOINTMENTS OR FROM GETTING MEDICATIONS?: NO

## 2025-08-28 ASSESSMENT — PATIENT HEALTH QUESTIONNAIRE - PHQ9
SUM OF ALL RESPONSES TO PHQ QUESTIONS 1-9: 0
SUM OF ALL RESPONSES TO PHQ QUESTIONS 1-9: 0
1. LITTLE INTEREST OR PLEASURE IN DOING THINGS: NOT AT ALL
SUM OF ALL RESPONSES TO PHQ QUESTIONS 1-9: 0
SUM OF ALL RESPONSES TO PHQ9 QUESTIONS 1 & 2: 0
2. FEELING DOWN, DEPRESSED OR HOPELESS: NOT AT ALL
1. LITTLE INTEREST OR PLEASURE IN DOING THINGS: NOT AT ALL
2. FEELING DOWN, DEPRESSED OR HOPELESS: NOT AT ALL
SUM OF ALL RESPONSES TO PHQ QUESTIONS 1-9: 0

## 2025-08-29 ASSESSMENT — ENCOUNTER SYMPTOMS
ABDOMINAL PAIN: 0
CHEST TIGHTNESS: 0
SHORTNESS OF BREATH: 0
RHINORRHEA: 0
SORE THROAT: 0
CONSTIPATION: 0
DIARRHEA: 0